# Patient Record
Sex: FEMALE | Race: WHITE | NOT HISPANIC OR LATINO | Employment: FULL TIME | URBAN - METROPOLITAN AREA
[De-identification: names, ages, dates, MRNs, and addresses within clinical notes are randomized per-mention and may not be internally consistent; named-entity substitution may affect disease eponyms.]

---

## 2023-12-26 ENCOUNTER — HOSPITAL ENCOUNTER (OUTPATIENT)
Dept: VASCULAR MEDICINE | Facility: CLINIC | Age: 63
Discharge: HOME | End: 2023-12-26
Payer: COMMERCIAL

## 2023-12-26 ENCOUNTER — APPOINTMENT (OUTPATIENT)
Dept: VASCULAR MEDICINE | Facility: HOSPITAL | Age: 63
End: 2023-12-26
Payer: COMMERCIAL

## 2023-12-26 DIAGNOSIS — M79.89 OTHER SPECIFIED SOFT TISSUE DISORDERS: ICD-10-CM

## 2023-12-26 DIAGNOSIS — M79.605 PAIN IN LEFT LEG: ICD-10-CM

## 2023-12-26 DIAGNOSIS — I83.893 VARICOSE VEINS OF BILATERAL LOWER EXTREMITIES WITH OTHER COMPLICATIONS: ICD-10-CM

## 2023-12-26 DIAGNOSIS — M79.604 PAIN IN RIGHT LEG: ICD-10-CM

## 2023-12-26 PROCEDURE — 93970 EXTREMITY STUDY: CPT | Performed by: SURGERY

## 2023-12-26 PROCEDURE — 93970 EXTREMITY STUDY: CPT

## 2024-01-04 ENCOUNTER — OFFICE VISIT (OUTPATIENT)
Dept: VASCULAR SURGERY | Facility: CLINIC | Age: 64
End: 2024-01-04
Payer: COMMERCIAL

## 2024-01-04 VITALS
DIASTOLIC BLOOD PRESSURE: 64 MMHG | HEIGHT: 66 IN | HEART RATE: 95 BPM | OXYGEN SATURATION: 97 % | WEIGHT: 220 LBS | BODY MASS INDEX: 35.36 KG/M2 | SYSTOLIC BLOOD PRESSURE: 118 MMHG

## 2024-01-04 DIAGNOSIS — I87.2 CHRONIC VENOUS INSUFFICIENCY OF LOWER EXTREMITY: Primary | ICD-10-CM

## 2024-01-04 PROCEDURE — 1036F TOBACCO NON-USER: CPT | Performed by: SURGERY

## 2024-01-04 PROCEDURE — 99213 OFFICE O/P EST LOW 20 MIN: CPT | Performed by: SURGERY

## 2024-01-04 RX ORDER — TELMISARTAN AND AMLODIPINE 10; 80 MG/1; MG/1
1 TABLET ORAL DAILY
COMMUNITY
Start: 2021-03-11

## 2024-01-04 NOTE — PROGRESS NOTES
"Mihaela Reaves is a 63 y.o. female     Subjective   This patient presents today for follow-up of her venous issues of her lower extremities.  She states that her lower extremity volume has been good.  She is wearing her compression stockings on a very consistent basis.  She has a very bad right knee that we will be needing a total knee replacement very soon with Dr. Drake.  She currently denies any fever chills nausea vomiting or headache.  She has not had any DVTs in the past         Objective     Vitals:    01/04/24 0700   BP: 118/64   Pulse: 95   SpO2: 97%      Physical Exam  This patient is alert and oriented x3 her head is normocephalic neck is soft and supple without palpable lymph nodes.  Heart is regular rate.  Lungs are clear.  Abdomen is obese and soft with positive bowel sounds.  There is no pain on palpation.  Upper and lower extremities have adequate range of motion with palpable brachial radial femoral and popliteal pulses.  Skin turgor is adequate.  Psychologically the patient appears to be acting appropriately.  Her lower leg volume is relatively normal.  Blood pressure 118/64, pulse 95, height 1.676 m (5' 6\"), weight 99.8 kg (220 lb), SpO2 97 %.            There are no problems to display for this patient.         Current Outpatient Medications:     telmisartan-amlodipine (Twynsta) 80-10 mg tablet, Take 1 tablet by mouth once daily., Disp: , Rfl:      Lab Results   Component Value Date    WBC 4.7 01/11/2022    HGB 13.6 01/11/2022    HCT 40.4 01/11/2022     (L) 01/11/2022    ALT 17 01/11/2022    AST 18 01/11/2022     01/11/2022    K 4.1 01/11/2022     01/11/2022    CREATININE 0.79 01/11/2022    BUN 24 (H) 01/11/2022    CO2 28 01/11/2022    INR 1.0 08/03/2021       Vascular US lower extremity venous insufficiency bilateral    Result Date: 12/26/2023           [facility], Vascular Lab [address], [place] [address]             Tel " [phone] and Fax [phone]  Vascular Lab Report VASC US LOWER EXTREMITY VENOUS INSUFFICIENCY BILATERAL  Patient Name:      YOVANY HUANGCOLIN       Reading Physician:  [id number] Jerilyn Mendez MD Study Date:        12/26/2023          Ordering Physician: [id number] ADALBERTO CARDENAS MRN/PID:           [id number]            Technologist:       Nicanor Frazier RVT, [facility] Accession#:        [id number]        Technologist 2: Date of Birth/Age: 1960 / 63 years Encounter#:         [id number] Gender:            F Admission Status:  Outpatient          Location Performed: [facility]  Diagnosis/ICD:    Varicose veins of bilateral lower extremities with other                   complications-I83.893 CPT Codes:        28805 Venous reflux study VV VI complete Patient Position: Study performed in a reverse Trendelenburg position.  CONCLUSIONS: Right Lower Venous Insufficiency: Reflux is noted in the knee level of great saphenous vein. There is reflux noted in the common femoral vein. The AASV is noted to have a 3.5sec reflux measuring aprox. 5.6mm and exit the fascia a aprox. 15.8cm from the origin. The GSV is noted to exit the fascia at the proximal thigh to the knee level. The GSV is noted to be fibrotic from proximal to distal calf segment. Prominent tributary with a reflux of 4.4sec and measuring aprox. 3.7mm is noted from the proximal to distal calf with multiple branching. Left Lower Venous Insufficiency: The left great saphenous vein was not visualized due to previous ablation. Right Lower Venous: No evidence of acute deep vein thrombus visualized in the right lower extremity. Left Lower Venous: No evidence of acute deep vein thrombus visualized in the left lower extremity.  Imaging & Doppler Findings:  Right          Compress Thrombus  Diam  Depth    Time SFJ              Yes      None                  7.30 sec Prox Thigh GSV   Yes      None Mid Thigh GSV    Yes      None Knee GSV         Yes      None   3.7 mm 5.6 mm 4.50 sec Prox Calf GSV           Fibrotic Mid Calf GSV            Fibrotic Dist Calf GSV           Fibrotic SPJ              Yes      None SSV Prox         Yes      None SSV Mid          Yes      None SSV Distal       Yes      None  Left       Compress Thrombus SFJ          Yes      None SPJ          Yes      None SSV Prox     Yes      None SSV Mid      Yes      None SSV Distal   Yes      None  Right       Compressible Thrombus        Flow CFV             Yes        None   Spontaneous/Phasic PFV             Yes        None FV Proximal     Yes        None FV Mid          Yes        None   Spontaneous/Phasic FV Distal       Yes        None Popliteal       Yes        None   Spontaneous/Phasic Peroneal        Yes        None PTV             Yes        None  Left        Compress Thrombus        Flow CFV           Yes      None   Spontaneous/Phasic PFV           Yes      None FV Proximal   Yes      None FV Mid        Yes      None   Spontaneous/Phasic FV Distal     Yes      None Popliteal     Yes      None   Spontaneous/Phasic Peroneal      Yes      None PTV           Yes      None  [id number] Jerilyn Mendez MD Electronically signed by [id number] Jerilyn Mendez MD on 12/26/2023 at 5:40:41 PM  ** Final **                 Assessment/Plan   Active Problems:  There are no active Hospital Problems.      This patient presents today for follow-up of her venous issues of her lower extremities.  She does wear her compression stockings on a very consistent basis.  She is obese at 5 foot 6 and weighs 220 pounds.  She has a very bad right knee and will be getting a right total knee replacement very soon with Dr. Drake.  Her recent venous duplex scan does not show any significant deep system reflux.  She has segmental reflux in her right greater saphenous vein that is minimal.  She has palpable pedal pulses.  At this  time, she can schedule her right total knee replacement from a vascular standpoint.  She will follow-up with me in 6 months      Michael Weinstein, DO

## 2024-01-25 ENCOUNTER — PREP FOR PROCEDURE (OUTPATIENT)
Dept: SURGERY | Facility: HOSPITAL | Age: 64
End: 2024-01-25
Payer: COMMERCIAL

## 2024-01-25 DIAGNOSIS — M17.11 ARTHRITIS OF RIGHT KNEE: Primary | ICD-10-CM

## 2024-01-25 RX ORDER — TRANEXAMIC ACID 100 MG/ML
1000 INJECTION, SOLUTION INTRAVENOUS ONCE
Status: CANCELLED | OUTPATIENT
Start: 2024-01-25 | End: 2024-01-25

## 2024-01-25 RX ORDER — CEFAZOLIN SODIUM 2 G/100ML
2 INJECTION, SOLUTION INTRAVENOUS EVERY 8 HOURS
Status: CANCELLED | OUTPATIENT
Start: 2024-02-13 | End: 2024-02-13

## 2024-01-25 RX ORDER — SODIUM CHLORIDE, SODIUM LACTATE, POTASSIUM CHLORIDE, CALCIUM CHLORIDE 600; 310; 30; 20 MG/100ML; MG/100ML; MG/100ML; MG/100ML
75 INJECTION, SOLUTION INTRAVENOUS CONTINUOUS
Status: CANCELLED | OUTPATIENT
Start: 2024-02-13

## 2024-02-02 ENCOUNTER — PREP FOR PROCEDURE (OUTPATIENT)
Dept: SURGERY | Facility: HOSPITAL | Age: 64
End: 2024-02-02
Payer: COMMERCIAL

## 2024-02-06 ENCOUNTER — PRE-ADMISSION TESTING (OUTPATIENT)
Dept: PREADMISSION TESTING | Facility: HOSPITAL | Age: 64
End: 2024-02-06
Payer: COMMERCIAL

## 2024-02-06 VITALS
WEIGHT: 219.8 LBS | TEMPERATURE: 98.1 F | SYSTOLIC BLOOD PRESSURE: 144 MMHG | BODY MASS INDEX: 35.32 KG/M2 | RESPIRATION RATE: 18 BRPM | OXYGEN SATURATION: 97 % | HEART RATE: 90 BPM | DIASTOLIC BLOOD PRESSURE: 83 MMHG | HEIGHT: 66 IN

## 2024-02-06 DIAGNOSIS — M17.11 ARTHRITIS OF RIGHT KNEE: ICD-10-CM

## 2024-02-06 LAB
ALBUMIN SERPL BCP-MCNC: 4.4 G/DL (ref 3.4–5)
ALP SERPL-CCNC: 113 U/L (ref 33–136)
ALT SERPL W P-5'-P-CCNC: 14 U/L (ref 7–45)
ANION GAP SERPL CALC-SCNC: 11 MMOL/L (ref 10–20)
APTT PPP: 33 SECONDS (ref 27–38)
AST SERPL W P-5'-P-CCNC: 18 U/L (ref 9–39)
BILIRUB SERPL-MCNC: 1 MG/DL (ref 0–1.2)
BUN SERPL-MCNC: 22 MG/DL (ref 6–23)
CALCIUM SERPL-MCNC: 9.3 MG/DL (ref 8.6–10.3)
CHLORIDE SERPL-SCNC: 107 MMOL/L (ref 98–107)
CO2 SERPL-SCNC: 25 MMOL/L (ref 21–32)
CREAT SERPL-MCNC: 0.7 MG/DL (ref 0.5–1.05)
EGFRCR SERPLBLD CKD-EPI 2021: >90 ML/MIN/1.73M*2
ERYTHROCYTE [DISTWIDTH] IN BLOOD BY AUTOMATED COUNT: 12.8 % (ref 11.5–14.5)
GLUCOSE SERPL-MCNC: 112 MG/DL (ref 74–99)
HCT VFR BLD AUTO: 42.8 % (ref 36–46)
HGB BLD-MCNC: 14.4 G/DL (ref 12–16)
INR PPP: 1 (ref 0.9–1.1)
MCH RBC QN AUTO: 28.7 PG (ref 26–34)
MCHC RBC AUTO-ENTMCNC: 33.6 G/DL (ref 32–36)
MCV RBC AUTO: 85 FL (ref 80–100)
NRBC BLD-RTO: 0 /100 WBCS (ref 0–0)
PLATELET # BLD AUTO: 167 X10*3/UL (ref 150–450)
POTASSIUM SERPL-SCNC: 4.3 MMOL/L (ref 3.5–5.3)
PROT SERPL-MCNC: 6.6 G/DL (ref 6.4–8.2)
PROTHROMBIN TIME: 10.7 SECONDS (ref 9.8–12.8)
RBC # BLD AUTO: 5.01 X10*6/UL (ref 4–5.2)
SODIUM SERPL-SCNC: 139 MMOL/L (ref 136–145)
WBC # BLD AUTO: 5.1 X10*3/UL (ref 4.4–11.3)

## 2024-02-06 PROCEDURE — 87081 CULTURE SCREEN ONLY: CPT | Mod: PARLAB | Performed by: NURSE PRACTITIONER

## 2024-02-06 PROCEDURE — 99204 OFFICE O/P NEW MOD 45 MIN: CPT | Performed by: NURSE PRACTITIONER

## 2024-02-06 PROCEDURE — 85027 COMPLETE CBC AUTOMATED: CPT

## 2024-02-06 PROCEDURE — 85610 PROTHROMBIN TIME: CPT

## 2024-02-06 PROCEDURE — 93005 ELECTROCARDIOGRAM TRACING: CPT

## 2024-02-06 PROCEDURE — 80053 COMPREHEN METABOLIC PANEL: CPT

## 2024-02-06 PROCEDURE — 36415 COLL VENOUS BLD VENIPUNCTURE: CPT

## 2024-02-06 ASSESSMENT — DUKE ACTIVITY SCORE INDEX (DASI)
CAN YOU RUN A SHORT DISTANCE: NO
CAN YOU DO MODERATE WORK AROUND THE HOUSE LIKE VACUUMING, SWEEPING FLOORS OR CARRYING GROCERIES: YES
CAN YOU PARTICIPATE IN MODERATE RECREATIONAL ACTIVITIES LIKE GOLF, BOWLING, DANCING, DOUBLES TENNIS OR THROWING A BASEBALL OR FOOTBALL: NO
CAN YOU DO LIGHT WORK AROUND THE HOUSE LIKE DUSTING OR WASHING DISHES: YES
CAN YOU WALK A BLOCK OR TWO ON LEVEL GROUND: YES
TOTAL_SCORE: 18.95
CAN YOU HAVE SEXUAL RELATIONS: NO
CAN YOU TAKE CARE OF YOURSELF (EAT, DRESS, BATHE, OR USE TOILET): YES
CAN YOU DO HEAVY WORK AROUND THE HOUSE LIKE SCRUBBING FLOORS OR LIFTING AND MOVING HEAVY FURNITURE: NO
CAN YOU WALK INDOORS, SUCH AS AROUND YOUR HOUSE: YES
CAN YOU CLIMB A FLIGHT OF STAIRS OR WALK UP A HILL: YES
CAN YOU DO YARD WORK LIKE RAKING LEAVES, WEEDING OR PUSHING A MOWER: NO
CAN YOU PARTICIPATE IN STRENOUS SPORTS LIKE SWIMMING, SINGLES TENNIS, FOOTBALL, BASKETBALL, OR SKIING: NO
DASI METS SCORE: 5.1

## 2024-02-06 NOTE — CPM/PAT H&P
"CPM/PAT Evaluation       Name: Mihaela Reaves (Mihaela Reaves)  /Age: 63 y.o.     In-Person       Chief Complaint: Right knee OA    63 yr old female with c/o Right knee OA.  Reports ongoing progressive pain worsened by activity including walking, standing and stairs.  Pain is constant ache w/intermittent stiffness; denies falls.  Reports knee is 'bone on bone' and causing much loss of sleep d/t pain. Has tried injections and physical therapy with no lasting relief.  Reports not as active as desired d/t knee pain, as cannot do enjoyable activities; denies cardiac or respiratory symptoms.  Denies past issues with anesthesia.  Patient requests \"general anesthesia\" for this surgery and states \"I do not want a spinal\".          Past Medical History:   Diagnosis Date    Arthritis     Colon polyp     GERD (gastroesophageal reflux disease)     Hypertension     Personal history of other diseases of the female genital tract     History of heavy periods       Past Surgical History:   Procedure Laterality Date     SECTION, CLASSIC  2018     Section    HYSTERECTOMY  2018    Hysterectomy    OTHER SURGICAL HISTORY  2022    Knee replacement    TONSILLECTOMY  2018    Tonsillectomy    VEIN SURGERY         Patient Sexual activity questions deferred to the physician.    No family history on file.    Allergies   Allergen Reactions    Tetracyclines GI Upset, GI intolerance, Diarrhea and Other     Yeast infection    Sulfa (Sulfonamide Antibiotics) Hives       Prior to Admission medications    Medication Sig Start Date End Date Taking? Authorizing Provider   telmisartan-amlodipine (Twynsta) 80-10 mg tablet Take 1 tablet by mouth once daily. 3/11/21   Historical Provider, MD        Review of Systems    Constitutional: no fever, no chills and not feeling poorly.   Eyes: no eyesight problems.   ENT: no hearing loss, no nosebleeds and no sore throat.   Cardiovascular: no chest pain, no " palpitations and no extremity edema.   Respiratory: no shortness of breath, no wheezing, no cough and no sob with exertion.   Gastrointestinal: negative for abdominal pain, blood in stools or changes in bowel habits   Genitourinary: negative for dysuria, incontinence or changes in urinary habits   Musculoskeletal: mild gait abnormality   Integumentary: negative for lesions, rash or itching.   Neurological: negative for confusion, dizziness, fainting or difficulty walking.   Psychiatric: not suicidal, no anxiety and no depression.   All other systems have been reviewed and are negative for complaint.     Physical Exam  Constitutional:       General: No acute distress.     Aox3, pleasant and cooperative, appropriate mood and eye contact   HENT:      Head: Normocephalic.      Mouth/Throat: Mucous membranes moist & pink  Eyes:      Vision grossly intact, PERRLA, corrective lenses in use   Neck:      No carotid bruit, no JVD  Cardiovascular:      RRR, S1S2, no murmurs, rubs or gallops  Pulmonary:      Symmetric chest expansion, CTA, Room Air  Abdominal:      Soft non-tender, BSx4   Skin:     Warm, dry & intact   Extremities:      No gross deformities; mild antalgic gait; compression stocking in use   Neurological:      No focal deficit, Aox3, TOVAR x4  Psychiatric:      Pleasant & cooperative, appropriate affect    PAT AIRWAY:   Airway:     Mallampati::  II    TM distance::  >3 FB    Neck ROM::  Full   Crowns       Visit Vitals  /83   Pulse 90   Temp 36.7 °C (98.1 °F) (Temporal)   Resp 18       DASI Risk Score      Flowsheet Row Most Recent Value   DASI SCORE 18.95   METS Score (Will be calculated only when all the questions are answered) 5.1          Caprini DVT Assessment    No data to display       Modified Frailty Index    No data to display       CHADS2 Stroke Risk  Current as of 14 minutes ago        N/A 3 - 100%: High Risk   2 - 3%: Medium Risk   0 - 2%: Low Risk     Last Change: N/A          This score  determines the patient's risk of having a stroke if the patient has atrial fibrillation.        This score is not applicable to this patient. Components are not calculated.          Revised Cardiac Risk Index    No data to display       Apfel Simplified Score    No data to display       Risk Analysis Index Results This Encounter    No data found in the last 1 encounters.       Stop Bang Score      Flowsheet Row Most Recent Value   Do you snore loudly? 1   Do you often feel tired or fatigued after your sleep? 0   Has anyone ever observed you stop breathing in your sleep? 0   Do you have or are you being treated for high blood pressure? 1   Recent BMI (Calculated) 35.5   Is BMI greater than 35 kg/m2? 1=Yes   Age older than 50 years old? 1=Yes   Is your neck circumference greater than 17 inches (Male) or 16 inches (Female)? 0   Gender - Male 0=No   STOP-BANG Total Score 4            Assessment and Plan:     Followed by ortho, Right knee OA    Vascular clearance provided ([place], 1/4/2024)    Right total knee replacement w/Dr Drake on 2/13/2024

## 2024-02-06 NOTE — PREPROCEDURE INSTRUCTIONS
Medication List            Accurate as of February 6, 2024  7:49 AM. Always use your most recent med list.                telmisartan-amlodipine 80-10 mg tablet  Commonly known as: Twynsta  Medication Adjustments for Surgery: Continue until night before surgery                          PRE-OPERATIVE INSTRUCTIONS FOR SURGERY    *Do not eat anything after midnight the night of surgery.  This includes food of any kind (including hard candy, cough drops, mints and gum) and beverages (including coffee and soda).  You may drink up to one 8 ounce glass of water up until three hours before your scheduled surgery time.    *One of our staff members will call you ONE business day before your surgery, between 11a-2p  To let you know the time to arrive.  If you have no received a call by 2 pm, call [phone]  *When you arrive at the hospital-->GO TO Registration on the ground floor  *Stop smoking 24 hours prior to surgery.  No Marijuana, CBD Oil or Vaping for 48 hours  *No alcohol 24 hours prior to surgery  *You will need a responsible adult to drive you home  -No acrylic nails or nail polish on at least one fingernail, NO polish on toes for foot surgery  -You may be asked to remove your dentures, partial plate, eyeglasses or contact lenses before going to surgery.  Please bring a case for these items.  -Body piercings need to be removed.  Jewelry and valuables should be left at home.  -Put on loose,  comfortable, clean clothing, that will accommodate bandages    HOME PREOPERATIVE ANTIBACTERIAL SHOWER:  -This shower is a way of cleaning the skin with germ killing solution before surgery.  The solution contains Chorhexidine (CHG).   Let your DrKylee Know if you are allergic to Chlorhexidine.    NIGHT BEFORE SURGERY:  IF you are having a TOTAL joint replacement- YOU WILL SHOWER 2 NIGHTS PRIOR to surgery    -Take a normal shower and wash your hair  -Turn OFF water to avoid rinsing the antibacterial skin cleanser off  (CHG).  -Apply the CHG cleanser to a clean and wet washcloth.  Lather your entire body from the neck down.    -DO NOT USE ON THE HEAD, FACE, or GENITALS.  -RINSE immediately if CHG is in contact with your eyes, ears or mouth  -Gently wash your body.  Have the CHG cleanser stay on your skin for 3 MINUTES.  -After 3 minutes, turn on water and rinse the CHG cleanser off your body completely  -DO NOT WASH with regular soap after using CHG.  -PAT DRY with a clean fresh towel  -DO NOT apply any blount, deodorants or lotions  -Dress in clean night cloths  **CHG cleanser will cause stains to fabrics if you wash them with bleach after use.     DAY OF SURGERY:  -Take shower-->JUST GET WET.  Turn OFF water.  -REPEAT the CHG cleanse as you did the night before.  -PAT DRY-->    What you may be asked to bring to surgery:  ___Crutches, walker      NPO Instructions:    Do not eat any food after midnight the night before your surgery/procedure.    Additional Instructions:     Day of Surgery:  Wear  comfortable loose fitting clothing  Do not use moisturizers, creams, lotions or perfume  All jewelry and valuables should be left at home

## 2024-02-08 LAB — STAPHYLOCOCCUS SPEC CULT: NORMAL

## 2024-02-10 LAB
ATRIAL RATE: 79 BPM
P AXIS: 40 DEGREES
P OFFSET: 172 MS
P ONSET: 140 MS
PR INTERVAL: 162 MS
Q ONSET: 221 MS
QRS COUNT: 13 BEATS
QRS DURATION: 78 MS
QT INTERVAL: 398 MS
QTC CALCULATION(BAZETT): 456 MS
QTC FREDERICIA: 436 MS
R AXIS: -12 DEGREES
T AXIS: 27 DEGREES
T OFFSET: 420 MS
VENTRICULAR RATE: 79 BPM

## 2024-02-13 ENCOUNTER — ANESTHESIA EVENT (OUTPATIENT)
Dept: OPERATING ROOM | Facility: HOSPITAL | Age: 64
End: 2024-02-13
Payer: COMMERCIAL

## 2024-02-13 ENCOUNTER — ANESTHESIA (OUTPATIENT)
Dept: OPERATING ROOM | Facility: HOSPITAL | Age: 64
End: 2024-02-13
Payer: COMMERCIAL

## 2024-02-13 ENCOUNTER — HOSPITAL ENCOUNTER (OUTPATIENT)
Facility: HOSPITAL | Age: 64
LOS: 1 days | Discharge: HOME HEALTH | End: 2024-02-13
Attending: ORTHOPAEDIC SURGERY | Admitting: ORTHOPAEDIC SURGERY
Payer: COMMERCIAL

## 2024-02-13 VITALS
TEMPERATURE: 97.3 F | SYSTOLIC BLOOD PRESSURE: 135 MMHG | HEART RATE: 96 BPM | WEIGHT: 219.8 LBS | RESPIRATION RATE: 16 BRPM | BODY MASS INDEX: 35.48 KG/M2 | DIASTOLIC BLOOD PRESSURE: 63 MMHG | OXYGEN SATURATION: 95 %

## 2024-02-13 DIAGNOSIS — Z01.818 PRE-OP TESTING: ICD-10-CM

## 2024-02-13 DIAGNOSIS — M17.11 ARTHRITIS OF RIGHT KNEE: Primary | ICD-10-CM

## 2024-02-13 DIAGNOSIS — G89.18 ACUTE POST-OPERATIVE PAIN: ICD-10-CM

## 2024-02-13 PROBLEM — I10 HTN (HYPERTENSION): Status: ACTIVE | Noted: 2024-02-13

## 2024-02-13 PROCEDURE — 2500000004 HC RX 250 GENERAL PHARMACY W/ HCPCS (ALT 636 FOR OP/ED): Performed by: PHYSICIAN ASSISTANT

## 2024-02-13 PROCEDURE — 88311 DECALCIFY TISSUE: CPT | Performed by: PATHOLOGY

## 2024-02-13 PROCEDURE — 7100000002 HC RECOVERY ROOM TIME - EACH INCREMENTAL 1 MINUTE: Performed by: ORTHOPAEDIC SURGERY

## 2024-02-13 PROCEDURE — 97530 THERAPEUTIC ACTIVITIES: CPT | Mod: GO

## 2024-02-13 PROCEDURE — 7100000010 HC PHASE TWO TIME - EACH INCREMENTAL 1 MINUTE: Performed by: ORTHOPAEDIC SURGERY

## 2024-02-13 PROCEDURE — 97165 OT EVAL LOW COMPLEX 30 MIN: CPT | Mod: GO

## 2024-02-13 PROCEDURE — C1776 JOINT DEVICE (IMPLANTABLE): HCPCS | Performed by: ORTHOPAEDIC SURGERY

## 2024-02-13 PROCEDURE — 2500000004 HC RX 250 GENERAL PHARMACY W/ HCPCS (ALT 636 FOR OP/ED): Performed by: ANESTHESIOLOGY

## 2024-02-13 PROCEDURE — 2780000003 HC OR 278 NO HCPCS: Performed by: ORTHOPAEDIC SURGERY

## 2024-02-13 PROCEDURE — 2500000005 HC RX 250 GENERAL PHARMACY W/O HCPCS: Performed by: ANESTHESIOLOGIST ASSISTANT

## 2024-02-13 PROCEDURE — 7100000009 HC PHASE TWO TIME - INITIAL BASE CHARGE: Performed by: ORTHOPAEDIC SURGERY

## 2024-02-13 PROCEDURE — 7100000011 HC EXTENDED STAY RECOVERY HOURLY - NURSING UNIT

## 2024-02-13 PROCEDURE — 97535 SELF CARE MNGMENT TRAINING: CPT | Mod: GO

## 2024-02-13 PROCEDURE — 2720000007 HC OR 272 NO HCPCS: Performed by: ORTHOPAEDIC SURGERY

## 2024-02-13 PROCEDURE — A27447 PR TOTAL KNEE ARTHROPLASTY: Performed by: ANESTHESIOLOGIST ASSISTANT

## 2024-02-13 PROCEDURE — A27447 PR TOTAL KNEE ARTHROPLASTY: Performed by: ANESTHESIOLOGY

## 2024-02-13 PROCEDURE — 3600000018 HC OR TIME - INITIAL BASE CHARGE - PROCEDURE LEVEL SIX: Performed by: ORTHOPAEDIC SURGERY

## 2024-02-13 PROCEDURE — 88305 TISSUE EXAM BY PATHOLOGIST: CPT | Mod: TC,SUR,PARLAB | Performed by: ORTHOPAEDIC SURGERY

## 2024-02-13 PROCEDURE — 7100000001 HC RECOVERY ROOM TIME - INITIAL BASE CHARGE: Performed by: ORTHOPAEDIC SURGERY

## 2024-02-13 PROCEDURE — 3700000001 HC GENERAL ANESTHESIA TIME - INITIAL BASE CHARGE: Performed by: ORTHOPAEDIC SURGERY

## 2024-02-13 PROCEDURE — 88305 TISSUE EXAM BY PATHOLOGIST: CPT | Performed by: PATHOLOGY

## 2024-02-13 PROCEDURE — 97161 PT EVAL LOW COMPLEX 20 MIN: CPT | Mod: GP

## 2024-02-13 PROCEDURE — 3700000002 HC GENERAL ANESTHESIA TIME - EACH INCREMENTAL 1 MINUTE: Performed by: ORTHOPAEDIC SURGERY

## 2024-02-13 PROCEDURE — 2500000004 HC RX 250 GENERAL PHARMACY W/ HCPCS (ALT 636 FOR OP/ED)

## 2024-02-13 PROCEDURE — A4217 STERILE WATER/SALINE, 500 ML: HCPCS | Performed by: ORTHOPAEDIC SURGERY

## 2024-02-13 PROCEDURE — 2500000004 HC RX 250 GENERAL PHARMACY W/ HCPCS (ALT 636 FOR OP/ED): Performed by: ORTHOPAEDIC SURGERY

## 2024-02-13 PROCEDURE — 2500000004 HC RX 250 GENERAL PHARMACY W/ HCPCS (ALT 636 FOR OP/ED): Performed by: ANESTHESIOLOGIST ASSISTANT

## 2024-02-13 PROCEDURE — 3600000017 HC OR TIME - EACH INCREMENTAL 1 MINUTE - PROCEDURE LEVEL SIX: Performed by: ORTHOPAEDIC SURGERY

## 2024-02-13 DEVICE — IMPLANTABLE DEVICE: Type: IMPLANTABLE DEVICE | Site: KNEE | Status: FUNCTIONAL

## 2024-02-13 DEVICE — PATELLA OVAL DOME 35: Type: IMPLANTABLE DEVICE | Site: KNEE | Status: FUNCTIONAL

## 2024-02-13 RX ORDER — TRANEXAMIC ACID 10 MG/ML
1000 INJECTION, SOLUTION INTRAVENOUS ONCE
Status: COMPLETED | OUTPATIENT
Start: 2024-02-13 | End: 2024-02-13

## 2024-02-13 RX ORDER — TRANEXAMIC ACID 10 MG/ML
1000 INJECTION, SOLUTION INTRAVENOUS ONCE
Status: DISCONTINUED | OUTPATIENT
Start: 2024-02-13 | End: 2024-02-13 | Stop reason: HOSPADM

## 2024-02-13 RX ORDER — MEPERIDINE HYDROCHLORIDE 25 MG/ML
12.5 INJECTION INTRAMUSCULAR; INTRAVENOUS; SUBCUTANEOUS EVERY 10 MIN PRN
Status: DISCONTINUED | OUTPATIENT
Start: 2024-02-13 | End: 2024-02-13 | Stop reason: HOSPADM

## 2024-02-13 RX ORDER — MORPHINE SULFATE 4 MG/ML
4 INJECTION INTRAVENOUS EVERY 4 HOURS PRN
Status: DISCONTINUED | OUTPATIENT
Start: 2024-02-13 | End: 2024-02-13 | Stop reason: HOSPADM

## 2024-02-13 RX ORDER — PROMETHAZINE HYDROCHLORIDE 25 MG/ML
INJECTION, SOLUTION INTRAMUSCULAR; INTRAVENOUS
Status: DISCONTINUED
Start: 2024-02-13 | End: 2024-02-13 | Stop reason: HOSPADM

## 2024-02-13 RX ORDER — POLYETHYLENE GLYCOL 3350 17 G/17G
17 POWDER, FOR SOLUTION ORAL DAILY
Start: 2024-02-13

## 2024-02-13 RX ORDER — MEPERIDINE HYDROCHLORIDE 25 MG/ML
INJECTION INTRAMUSCULAR; INTRAVENOUS; SUBCUTANEOUS AS NEEDED
Status: DISCONTINUED | OUTPATIENT
Start: 2024-02-13 | End: 2024-02-13

## 2024-02-13 RX ORDER — ONDANSETRON 4 MG/1
4 TABLET, FILM COATED ORAL EVERY 8 HOURS PRN
Status: DISCONTINUED | OUTPATIENT
Start: 2024-02-13 | End: 2024-02-13 | Stop reason: HOSPADM

## 2024-02-13 RX ORDER — TRANEXAMIC ACID 10 MG/ML
INJECTION, SOLUTION INTRAVENOUS
Status: COMPLETED
Start: 2024-02-13 | End: 2024-02-13

## 2024-02-13 RX ORDER — OXYCODONE HYDROCHLORIDE 5 MG/1
5-10 TABLET ORAL EVERY 6 HOURS PRN
Qty: 56 TABLET | Refills: 0 | Status: SHIPPED | OUTPATIENT
Start: 2024-02-13 | End: 2024-02-20

## 2024-02-13 RX ORDER — SODIUM CHLORIDE 0.9 G/100ML
IRRIGANT IRRIGATION AS NEEDED
Status: DISCONTINUED | OUTPATIENT
Start: 2024-02-13 | End: 2024-02-13 | Stop reason: HOSPADM

## 2024-02-13 RX ORDER — SODIUM CHLORIDE, SODIUM LACTATE, POTASSIUM CHLORIDE, CALCIUM CHLORIDE 600; 310; 30; 20 MG/100ML; MG/100ML; MG/100ML; MG/100ML
75 INJECTION, SOLUTION INTRAVENOUS CONTINUOUS
Status: DISCONTINUED | OUTPATIENT
Start: 2024-02-13 | End: 2024-02-13 | Stop reason: HOSPADM

## 2024-02-13 RX ORDER — ACETAMINOPHEN 325 MG/1
650 TABLET ORAL EVERY 6 HOURS SCHEDULED
Start: 2024-02-13

## 2024-02-13 RX ORDER — LIDOCAINE HYDROCHLORIDE 10 MG/ML
0.1 INJECTION INFILTRATION; PERINEURAL ONCE
Status: DISCONTINUED | OUTPATIENT
Start: 2024-02-13 | End: 2024-02-13 | Stop reason: HOSPADM

## 2024-02-13 RX ORDER — NALOXONE HYDROCHLORIDE 1 MG/ML
0.2 INJECTION INTRAMUSCULAR; INTRAVENOUS; SUBCUTANEOUS EVERY 5 MIN PRN
Status: DISCONTINUED | OUTPATIENT
Start: 2024-02-13 | End: 2024-02-13 | Stop reason: HOSPADM

## 2024-02-13 RX ORDER — OXYCODONE HYDROCHLORIDE 5 MG/1
10 TABLET ORAL EVERY 4 HOURS PRN
Status: DISCONTINUED | OUTPATIENT
Start: 2024-02-13 | End: 2024-02-13 | Stop reason: HOSPADM

## 2024-02-13 RX ORDER — PROPOFOL 10 MG/ML
INJECTION, EMULSION INTRAVENOUS AS NEEDED
Status: DISCONTINUED | OUTPATIENT
Start: 2024-02-13 | End: 2024-02-13

## 2024-02-13 RX ORDER — OXYCODONE HYDROCHLORIDE 5 MG/1
5 TABLET ORAL EVERY 6 HOURS PRN
Status: DISCONTINUED | OUTPATIENT
Start: 2024-02-13 | End: 2024-02-13 | Stop reason: HOSPADM

## 2024-02-13 RX ORDER — MIDAZOLAM HYDROCHLORIDE 1 MG/ML
INJECTION, SOLUTION INTRAMUSCULAR; INTRAVENOUS AS NEEDED
Status: DISCONTINUED | OUTPATIENT
Start: 2024-02-13 | End: 2024-02-13

## 2024-02-13 RX ORDER — CEFAZOLIN SODIUM 2 G/100ML
2 INJECTION, SOLUTION INTRAVENOUS EVERY 8 HOURS
Status: DISCONTINUED | OUTPATIENT
Start: 2024-02-13 | End: 2024-02-13 | Stop reason: HOSPADM

## 2024-02-13 RX ORDER — ONDANSETRON HYDROCHLORIDE 2 MG/ML
INJECTION, SOLUTION INTRAVENOUS AS NEEDED
Status: DISCONTINUED | OUTPATIENT
Start: 2024-02-13 | End: 2024-02-13

## 2024-02-13 RX ORDER — CEFAZOLIN SODIUM 2 G/100ML
2 INJECTION, SOLUTION INTRAVENOUS EVERY 8 HOURS
Status: COMPLETED | OUTPATIENT
Start: 2024-02-13 | End: 2024-02-13

## 2024-02-13 RX ORDER — DEXAMETHASONE SODIUM PHOSPHATE 4 MG/ML
INJECTION, SOLUTION INTRA-ARTICULAR; INTRALESIONAL; INTRAMUSCULAR; INTRAVENOUS; SOFT TISSUE AS NEEDED
Status: DISCONTINUED | OUTPATIENT
Start: 2024-02-13 | End: 2024-02-13

## 2024-02-13 RX ORDER — FENTANYL CITRATE 50 UG/ML
INJECTION, SOLUTION INTRAMUSCULAR; INTRAVENOUS AS NEEDED
Status: DISCONTINUED | OUTPATIENT
Start: 2024-02-13 | End: 2024-02-13

## 2024-02-13 RX ORDER — SODIUM CHLORIDE, SODIUM LACTATE, POTASSIUM CHLORIDE, CALCIUM CHLORIDE 600; 310; 30; 20 MG/100ML; MG/100ML; MG/100ML; MG/100ML
100 INJECTION, SOLUTION INTRAVENOUS CONTINUOUS
Status: DISCONTINUED | OUTPATIENT
Start: 2024-02-13 | End: 2024-02-13 | Stop reason: HOSPADM

## 2024-02-13 RX ORDER — METOCLOPRAMIDE HYDROCHLORIDE 5 MG/ML
INJECTION INTRAMUSCULAR; INTRAVENOUS AS NEEDED
Status: DISCONTINUED | OUTPATIENT
Start: 2024-02-13 | End: 2024-02-13

## 2024-02-13 RX ORDER — ONDANSETRON 4 MG/1
4 TABLET, FILM COATED ORAL EVERY 8 HOURS PRN
Qty: 20 TABLET | Refills: 0 | Status: SHIPPED | OUTPATIENT
Start: 2024-02-13

## 2024-02-13 RX ORDER — LIDOCAINE HCL/PF 100 MG/5ML
SYRINGE (ML) INTRAVENOUS AS NEEDED
Status: DISCONTINUED | OUTPATIENT
Start: 2024-02-13 | End: 2024-02-13

## 2024-02-13 RX ORDER — ROCURONIUM BROMIDE 10 MG/ML
INJECTION, SOLUTION INTRAVENOUS AS NEEDED
Status: DISCONTINUED | OUTPATIENT
Start: 2024-02-13 | End: 2024-02-13

## 2024-02-13 RX ORDER — ONDANSETRON HYDROCHLORIDE 2 MG/ML
4 INJECTION, SOLUTION INTRAVENOUS EVERY 8 HOURS PRN
Status: DISCONTINUED | OUTPATIENT
Start: 2024-02-13 | End: 2024-02-13 | Stop reason: HOSPADM

## 2024-02-13 RX ORDER — SCOLOPAMINE TRANSDERMAL SYSTEM 1 MG/1
1 PATCH, EXTENDED RELEASE TRANSDERMAL
Status: DISCONTINUED | OUTPATIENT
Start: 2024-02-13 | End: 2024-02-13 | Stop reason: HOSPADM

## 2024-02-13 RX ORDER — POLYETHYLENE GLYCOL 3350 17 G/17G
17 POWDER, FOR SOLUTION ORAL DAILY
Status: DISCONTINUED | OUTPATIENT
Start: 2024-02-13 | End: 2024-02-13 | Stop reason: HOSPADM

## 2024-02-13 RX ORDER — SCOLOPAMINE TRANSDERMAL SYSTEM 1 MG/1
PATCH, EXTENDED RELEASE TRANSDERMAL
Status: DISCONTINUED
Start: 2024-02-13 | End: 2024-02-13 | Stop reason: HOSPADM

## 2024-02-13 RX ORDER — ACETAMINOPHEN 325 MG/1
650 TABLET ORAL EVERY 6 HOURS SCHEDULED
Status: DISCONTINUED | OUTPATIENT
Start: 2024-02-13 | End: 2024-02-13 | Stop reason: HOSPADM

## 2024-02-13 RX ORDER — HYDROMORPHONE HYDROCHLORIDE 1 MG/ML
1 INJECTION, SOLUTION INTRAMUSCULAR; INTRAVENOUS; SUBCUTANEOUS EVERY 5 MIN PRN
Status: DISCONTINUED | OUTPATIENT
Start: 2024-02-13 | End: 2024-02-13 | Stop reason: HOSPADM

## 2024-02-13 RX ADMIN — PROMETHAZINE HYDROCHLORIDE 6.25 MG: 25 INJECTION INTRAMUSCULAR; INTRAVENOUS at 12:28

## 2024-02-13 RX ADMIN — SCOLOPAMINE TRANSDERMAL SYSTEM 1 PATCH: 1 PATCH, EXTENDED RELEASE TRANSDERMAL at 16:22

## 2024-02-13 RX ADMIN — ROCURONIUM BROMIDE 50 MG: 10 INJECTION, SOLUTION INTRAVENOUS at 08:40

## 2024-02-13 RX ADMIN — MIDAZOLAM 2 MG: 1 INJECTION INTRAMUSCULAR; INTRAVENOUS at 08:24

## 2024-02-13 RX ADMIN — METOCLOPRAMIDE 10 MG: 5 INJECTION, SOLUTION INTRAMUSCULAR; INTRAVENOUS at 08:57

## 2024-02-13 RX ADMIN — ONDANSETRON 4 MG: 2 INJECTION INTRAMUSCULAR; INTRAVENOUS at 08:30

## 2024-02-13 RX ADMIN — MIDAZOLAM 2 MG: 1 INJECTION INTRAMUSCULAR; INTRAVENOUS at 08:30

## 2024-02-13 RX ADMIN — PROPOFOL 50 MG: 10 INJECTION, EMULSION INTRAVENOUS at 09:10

## 2024-02-13 RX ADMIN — MEPERIDINE HYDROCHLORIDE 25 MG: 25 INJECTION INTRAMUSCULAR; INTRAVENOUS; SUBCUTANEOUS at 10:35

## 2024-02-13 RX ADMIN — SCOPALAMINE 1 PATCH: 1 PATCH, EXTENDED RELEASE TRANSDERMAL at 16:22

## 2024-02-13 RX ADMIN — FENTANYL CITRATE 50 MCG: 50 INJECTION, SOLUTION INTRAMUSCULAR; INTRAVENOUS at 08:40

## 2024-02-13 RX ADMIN — DEXAMETHASONE SODIUM PHOSPHATE 4 MG: 4 INJECTION, SOLUTION INTRAMUSCULAR; INTRAVENOUS at 08:57

## 2024-02-13 RX ADMIN — SUGAMMADEX 200 MG: 100 INJECTION, SOLUTION INTRAVENOUS at 10:43

## 2024-02-13 RX ADMIN — LIDOCAINE HYDROCHLORIDE 50 MG: 20 INJECTION INTRAVENOUS at 09:11

## 2024-02-13 RX ADMIN — ROCURONIUM BROMIDE 20 MG: 10 INJECTION, SOLUTION INTRAVENOUS at 09:37

## 2024-02-13 RX ADMIN — HYDROMORPHONE HYDROCHLORIDE 1 MG: 2 INJECTION, SOLUTION INTRAMUSCULAR; INTRAVENOUS; SUBCUTANEOUS at 10:05

## 2024-02-13 RX ADMIN — SODIUM CHLORIDE, POTASSIUM CHLORIDE, SODIUM LACTATE AND CALCIUM CHLORIDE 75 ML/HR: 600; 310; 30; 20 INJECTION, SOLUTION INTRAVENOUS at 07:42

## 2024-02-13 RX ADMIN — LIDOCAINE HYDROCHLORIDE 50 MG: 20 INJECTION INTRAVENOUS at 08:40

## 2024-02-13 RX ADMIN — ONDANSETRON 4 MG: 2 INJECTION INTRAMUSCULAR; INTRAVENOUS at 12:08

## 2024-02-13 RX ADMIN — CEFAZOLIN SODIUM 2 G: 2 INJECTION, SOLUTION INTRAVENOUS at 15:50

## 2024-02-13 RX ADMIN — SODIUM CHLORIDE, POTASSIUM CHLORIDE, SODIUM LACTATE AND CALCIUM CHLORIDE 100 ML/HR: 600; 310; 30; 20 INJECTION, SOLUTION INTRAVENOUS at 12:11

## 2024-02-13 RX ADMIN — SODIUM CHLORIDE, SODIUM LACTATE, POTASSIUM CHLORIDE, AND CALCIUM CHLORIDE: .6; .31; .03; .02 INJECTION, SOLUTION INTRAVENOUS at 08:27

## 2024-02-13 RX ADMIN — CEFAZOLIN SODIUM 2 G: 2 INJECTION, SOLUTION INTRAVENOUS at 08:44

## 2024-02-13 RX ADMIN — HYDROMORPHONE HYDROCHLORIDE 1 MG: 2 INJECTION, SOLUTION INTRAMUSCULAR; INTRAVENOUS; SUBCUTANEOUS at 10:14

## 2024-02-13 RX ADMIN — TRANEXAMIC ACID 1000 MG: 10 INJECTION, SOLUTION INTRAVENOUS at 09:50

## 2024-02-13 RX ADMIN — TRANEXAMIC ACID 1000 MG: 10 INJECTION, SOLUTION INTRAVENOUS at 08:59

## 2024-02-13 RX ADMIN — PROPOFOL 150 MG: 10 INJECTION, EMULSION INTRAVENOUS at 08:40

## 2024-02-13 SDOH — HEALTH STABILITY: MENTAL HEALTH: CURRENT SMOKER: 0

## 2024-02-13 ASSESSMENT — PAIN SCALES - GENERAL
PAINLEVEL_OUTOF10: 3
PAINLEVEL_OUTOF10: 6
PAINLEVEL_OUTOF10: 9
PAINLEVEL_OUTOF10: 7
PAINLEVEL_OUTOF10: 0 - NO PAIN
PAINLEVEL_OUTOF10: 7
PAINLEVEL_OUTOF10: 0 - NO PAIN
PAINLEVEL_OUTOF10: 5 - MODERATE PAIN
PAINLEVEL_OUTOF10: 5 - MODERATE PAIN
PAINLEVEL_OUTOF10: 0 - NO PAIN
PAINLEVEL_OUTOF10: 6
PAINLEVEL_OUTOF10: 6
PAINLEVEL_OUTOF10: 0 - NO PAIN
PAINLEVEL_OUTOF10: 0 - NO PAIN
PAINLEVEL_OUTOF10: 4

## 2024-02-13 ASSESSMENT — PAIN - FUNCTIONAL ASSESSMENT

## 2024-02-13 ASSESSMENT — COGNITIVE AND FUNCTIONAL STATUS - GENERAL
HELP NEEDED FOR BATHING: A LITTLE
STANDING UP FROM CHAIR USING ARMS: A LITTLE
CLIMB 3 TO 5 STEPS WITH RAILING: A LITTLE
MOBILITY SCORE: 19
MOVING TO AND FROM BED TO CHAIR: A LITTLE
TOILETING: A LITTLE
DRESSING REGULAR LOWER BODY CLOTHING: A LITTLE
TURNING FROM BACK TO SIDE WHILE IN FLAT BAD: A LITTLE
DAILY ACTIVITIY SCORE: 21
WALKING IN HOSPITAL ROOM: A LITTLE

## 2024-02-13 ASSESSMENT — COLUMBIA-SUICIDE SEVERITY RATING SCALE - C-SSRS
1. IN THE PAST MONTH, HAVE YOU WISHED YOU WERE DEAD OR WISHED YOU COULD GO TO SLEEP AND NOT WAKE UP?: NO
2. HAVE YOU ACTUALLY HAD ANY THOUGHTS OF KILLING YOURSELF?: NO
6. HAVE YOU EVER DONE ANYTHING, STARTED TO DO ANYTHING, OR PREPARED TO DO ANYTHING TO END YOUR LIFE?: NO

## 2024-02-13 ASSESSMENT — ACTIVITIES OF DAILY LIVING (ADL)
HOME_MANAGEMENT_TIME_ENTRY: 17
ADL_ASSISTANCE: INDEPENDENT
BATHING_ASSISTANCE: MINIMAL

## 2024-02-13 ASSESSMENT — PAIN DESCRIPTION - DESCRIPTORS: DESCRIPTORS: ACHING

## 2024-02-13 NOTE — SIGNIFICANT EVENT
Patient attended pre op educational TJR class prior to previous surgery 1/22.  RAPT score was 9.  Reviewed pre op CBC & CMP results.    Discussed discharge plan with patient.  For discharge home today with in home therapy follow up.  Verified with patient she plans to utilize [facility] therapy as arranged by surgeon's office.  Patient instructed to call contact number upon discharge to arrange appointment for tomorrow.    Reviewed discharge instructions with patient & pt's . Patient verbalizes understanding of activity, wound care, pain management, and follow up care.  TJR Zone form, pain medication administration form and Aquacel instruction sheet provided to patient.

## 2024-02-13 NOTE — ANESTHESIA PROCEDURE NOTES
Peripheral Block    Patient location during procedure: pre-op  Start time: 2/13/2024 9:10 AM  End time: 2/13/2024 9:20 AM  Reason for block: at surgeon's request and post-op pain management  Staffing  Performed: attending   Authorized by: Armond Lee MD    Performed by: Armond Lee MD  Preanesthetic Checklist  Completed: patient identified, IV checked, site marked, risks and benefits discussed, surgical consent, monitors and equipment checked, pre-op evaluation and timeout performed   Timeout performed at: 2/13/2024 9:10 AM  Peripheral Block  Patient position: laying flat  Prep: ChloraPrep  Patient monitoring: heart rate, cardiac monitor and continuous pulse ox  Laterality: right  Injection technique: single-shot  Needle  Needle type: short-bevel   Needle gauge: 22 G  Needle length: 8 cm  Catheter at skin depth: 4 cm  Test dose: negative  Assessment  Injection assessment: negative aspiration for heme, no paresthesia on injection, incremental injection and local visualized surrounding nerve on ultrasound  Paresthesia pain: none  Heart rate change: no  Slow fractionated injection: yes  Additional Notes  Pt. Refused Block.  NO BLOCK PERFORMED

## 2024-02-13 NOTE — ANESTHESIA PROCEDURE NOTES
Airway  Date/Time: 2/13/2024 8:40 AM  Urgency: elective    Airway not difficult    Staffing  Performed: attending and resident   Authorized by: Armond Lee MD    Performed by: TRA Oconnor  Patient location during procedure: OR    Indications and Patient Condition  Indications for airway management: anesthesia and airway protection  Spontaneous ventilation: present  Sedation level: deep  Preoxygenated: yes  MILS maintained throughout  Mask difficulty assessment: 1 - vent by mask    Final Airway Details  Final airway type: endotracheal airway      Successful airway: ETT  Cuffed: yes   Successful intubation technique: video laryngoscopy  Facilitating devices/methods: cricoid pressure  Endotracheal tube insertion site: oral  Blade: Gamaliel  Blade size: #3  ETT size (mm): 7.0  Cormack-Lehane Classification: grade I - full view of glottis  Placement verified by: chest auscultation and capnometry   Measured from: teeth  ETT to teeth (cm): 19  Number of attempts at approach: 1  Number of other approaches attempted: 0    Additional Comments  Elective Glidescope #3 used by paramedic student

## 2024-02-13 NOTE — ANESTHESIA POSTPROCEDURE EVALUATION
Patient: Mihaela Reaves    Procedure Summary       Date: 02/13/24 Room / Location: [facility] 06 / [facility]    Anesthesia Start: 0829 Anesthesia Stop: 1055    Procedure: RIGHT TOTAL KNEE REPLACEMENT (Right: Knee) Diagnosis:       Arthritis of right knee      (RIGHT KNEE ARTHRITIS)    Surgeons: Wale Drake MD Responsible Provider: Armond Lee MD    Anesthesia Type: general ASA Status: 2            Anesthesia Type: general    Vitals Value Taken Time   /63 02/13/24 1055   Temp 36.2 02/13/24 1055   Pulse 103 02/13/24 1055   Resp 14 02/13/24 1055   SpO2 97 02/13/24 1055       Anesthesia Post Evaluation    Patient participation: complete - patient participated  Level of consciousness: awake  Pain management: adequate  Airway patency: patent  Cardiovascular status: acceptable  Respiratory status: acceptable  Hydration status: acceptable  Postoperative Nausea and Vomiting: none        No notable events documented.

## 2024-02-13 NOTE — ANESTHESIA PREPROCEDURE EVALUATION
Patient: Mihaela Reaves    Procedure Information       Date/Time: 02/13/24 0830    Procedure: RIGHT TOTAL KNEE REPLACEMENT (Right: Knee) - RIGHT TOTAL KNEE REPLACEMENT   SAME DAY DISCHARGE  SCHEDULE AT 730AM    Location: [facility] / [facility]    Surgeons: Wale Drake MD            Relevant Problems   Anesthesia (within normal limits)      Cardiovascular   (+) HTN (hypertension)      Other   (+) Arthritis of right knee       Clinical information reviewed:   Tobacco  Allergies  Meds   Med Hx  Surg Hx  OB Status  Fam Hx  Soc   Hx        NPO Detail:  NPO/Void Status  Date of Last Liquid: 02/12/24  Time of Last Liquid: 2330  Date of Last Solid: 02/12/24  Time of Last Solid: 2245         Physical Exam    Airway  Mallampati: III  TM distance: >3 FB  Neck ROM: full     Cardiovascular - normal exam     Dental - normal exam     Pulmonary - normal exam     Abdominal   (+) obese             Anesthesia Plan    History of general anesthesia?: yes  History of complications of general anesthesia?: no    ASA 2     general     The patient is not a current smoker.  Patient was previously instructed to abstain from smoking on day of procedure.  Patient did not smoke on day of procedure.    intravenous induction   Postoperative administration of opioids is intended.  Trial extubation is planned.  Anesthetic plan and risks discussed with patient.  Use of blood products discussed with patient who consented to blood products.    Plan discussed with CAA.

## 2024-02-13 NOTE — OP NOTE
PRE OP DIAGNOSIS - OA RT KNEE    POST OP DIAGNOSIS- OA RT KNEE    PROCEDURE - RT TKR    SURGEON- VAN TORRES MD    ASSIST-    KYLEE OSULLIVAN PA-C    ANESTHESIA- GENERAL    BLOOD LOSS   150 CC    FINDINGS- SEVERE TRICOMPARTMENT OA    TOURNIQUET- NONE    DRAIN- NONE    IMPLANTS  j&j PFC    FEMUR 4N PS     TIBIAL BASE PLATE  3   INSERT   8 MM PS    PATELLA  35    OPERATIVE PROCEDURE    PATIENT WAS TAKEN TO THE OPERATING ROOM. UNDER ADEQUATE SPINAL ANESTHESIA, THE PATIENT WAS PLACED SUPINE ON THE TABLE AND THE RIGHT LEG WAS PREPPED AND DRAPED IN A STERILE MANNER.  SHE HAD ABOUT 25 DEGREE FLEXION CONTRACTURE PRE OP. NO TOURNIQUET WAS USED. THE KNEE WAS FLEXED TO 90 DEGREES AND ANTERIOR MIDLINE INCISION WAS MADE THROUGH THE SKIN, SUBCUTANEOUS TISSUE AND A MEDIAL DISSECTION WAS MADE.  A MEDIAL PARAPATELLAR CAPSULOTOMY WAS MADE AND THE PATELLA WAS EVERTED LATERALLY.  THE INFRAPATELLAR FAT PAD WAS EXCISED AND A MEDIAL RELEASE WAS PERFORMED.  THE ACL, PCL AND MEDIAL AND LATERAL MENISCI WERE EXCISED.  A DRILL WAS USED TO MAKE A HOLE IN THE DISTAL FEMUR WITH THE JIG SET AT 5 DEGREES VALGUS.  THE DISTAL FEMORAL CUT WAS MADE AND SIZED TO THE ABOVE IMPLANT. THE ANTERIOR, POSTERIOR CHAMFER AND BOX CUTS WERE MADE.  I TRIALED THE ABOVE SIZED FEMUR. NEXT A DRILL WAS USE TO ENTER THE PROXIMAL TIBIA AND THE TIBIA WAS CUT USING A 3 DEGREE POSTERIOR SLOPE BLOCK AND SIZED TO THE ABOVE IMPLANT.  A TRIAL REDUCTION WAS MADE WITH THE ABOVE FEMUR, TIBIA AND INSERT. THE PATELLA WAS CUT FREE HAND AND SIZED TO THE ABOVE IMPLANT.  THE KNEE HAD GOOD RANGE OF MOTION, GOOD MEDIAL AND LATERAL STABILITY AND GOOD PATELLAR TRACKING.  THE TRIAL COMPONENTS WERE REMOVED AND THE KNEE WAS IRRIGATED WITH PULSE IRRIGATION AND AN ORTHOPAEDIC COCKTAIL WAS INJECTED INTO THE POSTERIOR CAPSULE.  USING THE APPROPRIATE CEMENT AND THE J&J PFC SYSTEM THE ABOVE IMPLANTS WERE CEMENTED AND THE KNEE WAS PLACED IN FULL EXTENSION UNTIL THE CEMENT HARDENED.  EXCESS CEMENT  WAS REMOVED.  AGAIN THE KNEE HAD GOOD RANGE OF MOTION, GOOD STABILITY AND GOOD PATELLA TRACKING. THE KNEE WAS THEN IRRIGATED.  THE DEEP TISSUES WERE CLOSED WITH  #1 VICRYL, THE SUBCUTANEOUS TISSUE WAS CLOSED WITH 2-0 VICRYL AND THE SKIN WAS CLOSED WITH STAPLES.  A DRY STERILE BANDAGE WAS APPLIED AND THE PATIENT RETURNED TO THE RECOVERY ROOM IN SATISFACTORY CONDITION.    THE EXCELLENT ASSISTANCE OF CERTIFIED PA WAS NECESSARY FOR SUCCESSFUL OUTCOME OF THIS SURGERY INCLUDING PREP, EXPOSURE, RETRACTING AND CLOSURE    DICTATED BY DR. VAN TORRES

## 2024-02-13 NOTE — PROGRESS NOTES
Occupational Therapy    Evaluation    Patient Name: Mihaela Reaves  MRN: [id number]  Today's Date: 2/13/2024  Time Calculation  Start Time: 1440  Stop Time: 1529  Time Calculation (min): 49 min  15 minute eval and 34 minute treatment.    Assessment  IP OT Assessment  OT Assessment: IMPAIRED BALANCE, AND FUNCTIONAL MOBILTY DUE TO PAIN  Barriers to Discharge: None  Evaluation/Treatment Tolerance: Patient tolerated treatment well (TREATMENT SESSION FOR ADL RETRAINING LASTED 34 minutes FOR LB DRESSING AND CAR TRANSFER TRAINING AND ISSUING AND FITTING AIYANA HOSE. UB DRS- SBA, LB DSG-SBA WITH USE OF DSG STICK, AIYANA HOSE MAX ASSIST. CAR TRSF SIMULATED WITH CGA. FUNCT MOB. BED TO CHAIR.)  End of Session Communication: Bedside nurse, Care Coordinator  End of Session Patient Position: Up in chair    Plan:  Treatment Interventions: ADL retraining, Functional transfer training, Patient/family training  OT Frequency: Daily  OT Discharge Recommendations: Low intensity level of continued care  Equipment Recommended upon Discharge:  (RECOMMEND TUB BENCH.)  OT - OK to Discharge: Yes (YES WHEN MEDICALLY STABLE.)    Subjective     Current Problem:  1. Arthritis of right knee  Pulse oximetry, spot    Insert and maintain peripheral IV    Saline lock IV    lactated Ringer's infusion    ceFAZolin in dextrose (iso-os) (Ancef) IVPB 2 g    tranexamic acid in NaCl,iso-os 1,000 mg/100 mL (10 mg/mL) IV 1,000 mg    Admit to inpatient    Full code    Pulse oximetry, spot    Insert and maintain peripheral IV    Saline lock IV    Admit to inpatient    Full code    Surgical Pathology Exam    Surgical Pathology Exam    tranexamic acid in NaCl,iso-os 1,000 mg/100 mL (10 mg/mL) IV 1,000 mg    morphine 8 mg, ketorolac (Toradol) 30 mg, lidocaine-epinephrine (Xylocaine W/EPI) 1 %-1:100,000 20 mL, ropivacaine (Naropin) 100 mg in sodium chloride 0.9% 49 mL injection    acetaminophen (Tylenol) 325 mg tablet    polyethylene glycol (Glycolax, Miralax) 17 gram packet     ondansetron (Zofran) 4 mg tablet    CANCELED: Vital Signs    CANCELED: Apply AIYANA hose knee length    CANCELED: Apply sequential compression device    CANCELED: Nerve Block    CANCELED: Vital Signs    CANCELED: Apply AIYANA hose knee length    CANCELED: Apply sequential compression device    CANCELED: Nerve Block      2. Pre-op testing  Staphylococcus aureus/MRSA colonization, Culture      3. Acute post-operative pain  oxyCODONE (Roxicodone) 5 mg immediate release tablet    apixaban (Eliquis) 2.5 mg tablet          General:  General  Reason for Referral: OT TO EVAL AND TREAT FOR ADLS AND SAFETY ASSESSMENT S/P RTKR.  Past Medical History Relevant to Rehab: ARTHRITIS.  Family/Caregiver Present:  ( PRESENT DURING EVAL AND TREATMENT SESSION AND FOR FAMILY TRAINING.)  Co-Treatment: PT  Co-Treatment Reason: MAXIMIZE PT SAFETY S/P SURGERY  Prior to Session Communication: Bedside nurse  Patient Position Received: On cart  General Comment: ATTEMPTED TO SEE PT EARLIER AT 1:30, BUT PT WAS TO [place], COLLABORATED WITH PT AND STAFF AND AGREED TO TRY AGAIN AN HOUR LATER. (PT MORE ALERT THIS SESSION AND AGREEABLE TO PARTICIPATE WITH THERAPY.)    Precautions:       Vital Signs:  BP: 166/75  Patient Position: Lying    Pain:  Pain Assessment  Pain Assessment: 0-10  Pain Score: 9  Pain Type: Surgical pain  Pain Location: Knee  Pain Frequency:  (WITH MOVEMENT)    Objective     Cognition:  Overall Cognitive Status: Within Functional Limits       Home Living:  Type of Home: House  Lives With: Spouse  Home Adaptive Equipment: Walker rolling or standard, Cane, Reacher  Home Layout: Two level (BED AND FULL BATH ON 2ND FLOOR, HOWEVER, PT WILL BE SLEEPING ON COUCH INITIATALLY.)  Home Access: Stairs to enter with rails  Entrance Stairs-Number of Steps: 2  Bathroom Shower/Tub: Tub/shower unit  Bathroom Toilet: Handicapped height     Prior Function:  Level of [place]: Independent with ADLs and functional transfers  Receives Help  From: Family  ADL Assistance: Independent  Ambulatory Assistance: Independent  Prior Function Comments: PT DID NOT USE AD PTA.    IADL History:  Homemaking Responsibilities:  (SHARED)    ADL:  Eating Assistance: Independent  Grooming Assistance: Stand by  Bathing Assistance: Minimal  UE Dressing Assistance: Stand by  LE Dressing Assistance: Minimal  LE Dressing Deficit:  (MAX ASSIST FOR AIYANA HOSE)  Toileting Deficit: Setup  Functional Assistance: Minimal    Activity Tolerance:  Endurance: Tolerates 10 - 20 min exercise with multiple rests    Bed Mobility/Transfers:   Bed Mobility  Bed Mobility:  (SUPINE TO SIT OFF CART TO EDGE OF CARE WITH SBA)  Transfers  Transfer:  (SIT TO STAND CGA FROM CHAIR AND FROM CART.)    Ambulation/Gait Training:  Ambulation/Gait Training  Ambulation/Gait Training Performed:  (FUNCTIONAL MOBILITY WITH WHEELED WALKER WITH CGA.)    Sitting Balance:  Static Sitting Balance  Static Sitting-Balance Support:  (GOOD.)    Standing Balance:  Static Standing Balance  Static Standing-Balance Support:  (CGA WITH WALKER)    Vision:     and      Sensation:  Light Touch: No apparent deficits    Strength:  Strength Comments: BUE WFL      Coordination:  Finger to Target: Intact     Hand Function:  Hand Function  Gross Grasp: Functional    Extremities: RUE   RUE : Within Functional Limits and LUE   LUE: Within Functional Limits    Outcome Measures: [facility] Daily Activity  Putting on and taking off regular lower body clothing: A little  Bathing (including washing, rinsing, drying): A little  Putting on and taking off regular upper body clothing: None  Toileting, which includes using toilet, bedpan or urinal: A little  Taking care of personal grooming such as brushing teeth: None  Eating Meals: None  Daily Activity - Total Score: 21          EDUCATION:  Education  Individual(s) Educated: Spouse, Patient  Equipment:  (AIYANA HOSE SCHEDULE AND DONNING AND DOFFING TECHNQIUE, CAR TRANSFER TRAINING.)  Education  Documentation  Body Mechanics, taught by Bharati Jones OT at 2/13/2024  4:08 PM.  Learner: Significant Other, Patient  Readiness: Eager  Method: Explanation, Demonstration, Teach-back  Response: Verbalizes Understanding    Precautions, taught by Bharati Jones OT at 2/13/2024  4:08 PM.  Learner: Significant Other, Patient  Readiness: Eager  Method: Explanation, Demonstration, Teach-back  Response: Verbalizes Understanding    Home Exercise Program, taught by Bharati Jones OT at 2/13/2024  4:08 PM.  Learner: Significant Other, Patient  Readiness: Eager  Method: Explanation, Demonstration, Teach-back  Response: Verbalizes Understanding    ADL Training, taught by Bharati Jones OT at 2/13/2024  4:08 PM.  Learner: Significant Other, Patient  Readiness: Eager  Method: Explanation, Demonstration, Teach-back  Response: Verbalizes Understanding    Education Comments  No comments found.        Goals:   Encounter Problems       Encounter Problems (Active)       Dressing Upper Extremities       STG - Patient will dress upper body with independent after set up.  (Progressing)       Start:  02/13/24    Expected End:  02/14/24               Dressings Lower Extremities       STG - Patient will lois/doff pants over feet with ae as needed with sba.  (Progressing)       Start:  02/13/24    Expected End:  02/14/24               Grooming       STG - Patient completes grooming with set up.  (Progressing)       Start:  02/13/24    Expected End:  02/14/24               Mobility       STG - Patient will ambulate with least restrictive device with  with cga (Progressing)       Start:  02/13/24    Expected End:  02/14/24               Transfers       STG - Patient will transfer sit to and from stand with sba (Progressing)       Start:  02/13/24    Expected End:  02/14/24            STG - Patient will perform car transfer with cga.  (Progressing)       Start:  02/13/24    Expected End:  02/14/24

## 2024-02-13 NOTE — DISCHARGE INSTRUCTIONS
PAIN MANAGEMENT AT HOME:  To provide the best pain control over the next few days when pain will be at it's worst, we suggest you continue to take the following medications routinely and separately to provide the best pain management.  In addition, apply ice VERY FREQUENTLY to incision.   Also use some type of alternative intervention such as music therapy, relaxation techniques, or an type of diversion (such as watching television or talking to a friend) to help control pain.  Do NOT take more than 4000mg of acetaminophen (tylenol) in 24 hours.        CONSTIPATION MANAGEMENT AT HOME:  Constipation is common after Joint Replacement surgery for several reasons.  A common side effect of all pain medication is constipation.  A decrease in your activity as well as change in your normal diet & appetite all contribute to the constipation.  At home, it is important to take a daily stool softener such as Colace, Milk of magnesium or senokot while you are taking pain medications to help manage the constipation.  In addition, if you do NOT have a bowel movement within 72 hours of discharge, add a laxative such as miralax.  Miralax normally takes 2 to 3 days to work so you will not receive immediate results.  It is also important to drink plenty of fluids, especially water.  Stool softeners & laxatives need water to work properly.  We also suggest you increase your fiber intake either with foods high in fiber or with some type of supplement such as benefiber or metamucil.    Foods that are high in fiber include:     Fruits such as pears, strawberries, avocado, apples, raspberries, bananas, kiwis   Vegetables such as carrots, beets, broccoli, brussel sprouts, spinach   Lentils and kidney beans   Split peas and chickpeas   Oats, almonds, cynthia seeds, and sweet potatoes      ACTIVITY AT HOME:  You will need to continue with your therapy after discharge either with in home therapy or at an outpatient facility.  Most patients  initially do in home therapy for about two weeks then transition to outpatient therapy.  You have freedom of choice in which in home therapy and outpatient facility you plan to use.  Most in home therapy sessions will begin within 24 to 48 hours after discharge.  After about two weeks of in home therapy, you will most likely to ready for outpatient therapy sessions.  Outpatient therapy is normally twice a week for weeks.  While you are at home it is important that you perform the exercises the therapist went over with you in the hospital 2 to 3 times a day.  After you complete your exercises, apply ice to your incision to help with swelling and pain.  You should also be getting up and walking around your house every hour with the use of your walker.  While at rest, perform, ankle pumps on each foot at least ten times.  This will help with the swelling as well as decrease your risk for blood clots.  ALWAYS USE YOUR WALKER WITH ANY TYPE OF ACTIVITY!!!!    WOUND CARE:  The bandage on your incision will stay in place for 7 days.  The bandage is waterproof so you may shower with the bandage in place.  No need to wrap or cover it.  Some drainage on your bandage is perfectly normal.  Home health care will assist you with bandage removal.  Once the bandage is removed, your incision can be left open to air if there is no drainage present.  If your incision is draining at the time of bandage removal, home health care will assist you with applying a new gauze bandage.  Gauze bandages are NOT waterproof.    Swelling in your operative extremity will peak about 72 hours after surgery and can last for weeks.  To help with the swelling make sure you are elevating your leg and apply ice frequently.  In addition, you will receive compression stockings upon discharge from the hospital.  Make sure you are wearing these stockings on both your legs at home.  Generally we instruct you to wear during the day and remove at bedtime for the  next 4 weeks.      *Apply ice to incision at least 5 times daily    *Wear bilateral alondra hose stockings to lower extremities for next 4 weeks    *Do NOT take any non steroidal anti-inflammatory medications such as motrin, aleve, ibuprofen, celebrex or meloxicam    *Take daily stool softener.  If you experience any diarrhea, stop medication    *If you need a refill on your pain medication, contact your surgeon's office Monday through Thursday with as least 24 hours notice    If you have any questions or concerns please contact the Joint  Bernadine Thompson at:  Office: [phone]   Cell:  [phone]  Email:  [email or web address]

## 2024-02-14 ENCOUNTER — TELEPHONE (OUTPATIENT)
Dept: ORTHOPEDICS | Facility: HOSPITAL | Age: 64
End: 2024-02-14
Payer: COMMERCIAL

## 2024-02-14 NOTE — TELEPHONE ENCOUNTER
Routine discharge follow up phone call completed.  States in home therapist is not able to see patient until tomorrow.  Encouraged patient to perform daily exercises at home as instructed by therapy 3x daily.

## 2024-02-21 LAB
LABORATORY COMMENT REPORT: NORMAL
PATH REPORT.FINAL DX SPEC: NORMAL
PATH REPORT.GROSS SPEC: NORMAL
PATH REPORT.RELEVANT HX SPEC: NORMAL
PATH REPORT.TOTAL CANCER: NORMAL

## 2024-04-16 ENCOUNTER — LAB (OUTPATIENT)
Dept: LAB | Facility: LAB | Age: 64
End: 2024-04-16
Payer: COMMERCIAL

## 2024-04-16 DIAGNOSIS — G50.0 TRIGEMINAL NEURALGIA: Primary | ICD-10-CM

## 2024-04-16 LAB
BUN SERPL-MCNC: 16 MG/DL (ref 6–23)
CREAT SERPL-MCNC: 0.71 MG/DL (ref 0.5–1.05)
EGFRCR SERPLBLD CKD-EPI 2021: >90 ML/MIN/1.73M*2

## 2024-04-16 PROCEDURE — 82565 ASSAY OF CREATININE: CPT

## 2024-04-16 PROCEDURE — 84520 ASSAY OF UREA NITROGEN: CPT

## 2024-04-16 PROCEDURE — 36415 COLL VENOUS BLD VENIPUNCTURE: CPT

## 2024-07-11 ENCOUNTER — APPOINTMENT (OUTPATIENT)
Dept: VASCULAR SURGERY | Facility: CLINIC | Age: 64
End: 2024-07-11
Payer: COMMERCIAL

## 2024-07-11 VITALS
WEIGHT: 216 LBS | OXYGEN SATURATION: 98 % | DIASTOLIC BLOOD PRESSURE: 62 MMHG | SYSTOLIC BLOOD PRESSURE: 118 MMHG | BODY MASS INDEX: 34.72 KG/M2 | HEART RATE: 71 BPM | HEIGHT: 66 IN

## 2024-07-11 DIAGNOSIS — I87.2 CHRONIC VENOUS INSUFFICIENCY OF LOWER EXTREMITY: Primary | ICD-10-CM

## 2024-07-11 PROCEDURE — 1036F TOBACCO NON-USER: CPT | Performed by: SURGERY

## 2024-07-11 PROCEDURE — 3078F DIAST BP <80 MM HG: CPT | Performed by: SURGERY

## 2024-07-11 PROCEDURE — 3074F SYST BP LT 130 MM HG: CPT | Performed by: SURGERY

## 2024-07-11 PROCEDURE — 99213 OFFICE O/P EST LOW 20 MIN: CPT | Performed by: SURGERY

## 2024-07-11 RX ORDER — PREGABALIN 150 MG/1
150 CAPSULE ORAL 2 TIMES DAILY
COMMUNITY
Start: 2024-05-31

## 2024-07-11 NOTE — PROGRESS NOTES
"Mihaela Reaves is a 64 y.o. female     Subjective   This patient was seen and evaluated in the office today for her venous issues of her lower extremities.  She had a right total knee replacement in February of this year.  She states that she is doing extremely well.  She is obese at 5 foot 6 and 216 pounds.  She has never smoked and she is currently not a diabetic.  She states that she is now on a walking program 3-4 times per week and is able to walk a mile at a time.  She is extremely happy about this.  She is on Lyrica for some trigeminal nerve pain.  She denies any significant consistent swelling of her lower extremities.  She denies any fever chills nausea vomiting or headache         Objective     Vitals:    07/11/24 0700   BP: 118/62   Pulse: 71   SpO2: 98%      Physical Exam  This patient is alert and oriented x3 her head is normocephalic neck is soft and supple without palpable lymph nodes.  Heart is regular rate.  Lungs are clear.  Abdomen soft with positive bowel sounds.  There is no pain on palpation.  Upper and lower extremities have adequate range of motion with palpable brachial radial femoral and popliteal pulses.  Skin turgor is adequate.  Psychologically the patient appears to be acting appropriately.  She does have palpable pedal pulses.  Her lower leg volume appears under control  Blood pressure 118/62, pulse 71, height 1.676 m (5' 6\"), weight 98 kg (216 lb), SpO2 98%.            Patient Active Problem List    Diagnosis Date Noted    Arthritis of right knee 02/13/2024    Acute post-operative pain 02/13/2024    HTN (hypertension) 02/13/2024    Chronic venous insufficiency of lower extremity 01/04/2024          Current Outpatient Medications:     pregabalin (Lyrica) 150 mg capsule, Take 1 capsule (150 mg) by mouth 2 times a day., Disp: , Rfl:     acetaminophen (Tylenol) 325 mg tablet, Take 2 tablets (650 mg) by mouth every 6 hours., Disp: , Rfl:     apixaban (Eliquis) 2.5 mg tablet, Take 1 tablet " (2.5 mg) by mouth 2 times a day., Disp: 60 tablet, Rfl: 0    ondansetron (Zofran) 4 mg tablet, Take 1 tablet (4 mg) by mouth every 8 hours if needed for nausea or vomiting., Disp: 20 tablet, Rfl: 0    polyethylene glycol (Glycolax, Miralax) 17 gram packet, Take 17 g by mouth once daily., Disp: , Rfl:     telmisartan-amlodipine (Twynsta) 80-10 mg tablet, Take 1 tablet by mouth once daily., Disp: , Rfl:      Lab Results   Component Value Date    WBC 5.1 02/06/2024    HGB 14.4 02/06/2024    HCT 42.8 02/06/2024     02/06/2024    ALT 14 02/06/2024    AST 18 02/06/2024     02/06/2024    K 4.3 02/06/2024     02/06/2024    CREATININE 0.71 04/16/2024    BUN 16 04/16/2024    CO2 25 02/06/2024    INR 1.0 02/06/2024       No results found.              Assessment/Plan   Active Problems:  There are no active Hospital Problems.      This patient presents today for follow-up of her venous issues and lower extremity swelling.  She states that her swelling has been very well under control.  She did have a total right knee replacement in February of this year.  She states that she is doing extremely well.  She does a walking program now 3-4 times per week and is able to now walk a mile at a time.  She has never smoked and she is currently not a diabetic and she does have palpable pedal pulses.  Her lower leg volume is well under control.  I am encouraging her to consistently wear her compression stockings and periodically elevate her legs and to exercise her lower extremities to the best of her ability on a daily basis.  I would like her to follow-up in 6 months.  No testing needed at this time      Michael Weinstein, DO

## 2024-08-29 ENCOUNTER — HOSPITAL ENCOUNTER (OUTPATIENT)
Dept: RADIOLOGY | Facility: CLINIC | Age: 64
Discharge: HOME | End: 2024-08-29
Payer: COMMERCIAL

## 2024-08-29 VITALS — WEIGHT: 216.05 LBS | HEIGHT: 66 IN | BODY MASS INDEX: 34.72 KG/M2

## 2024-08-29 DIAGNOSIS — Z12.31 SCREENING MAMMOGRAM FOR BREAST CANCER: ICD-10-CM

## 2024-08-29 PROCEDURE — 77067 SCR MAMMO BI INCL CAD: CPT

## 2024-08-29 PROCEDURE — 77067 SCR MAMMO BI INCL CAD: CPT | Performed by: RADIOLOGY

## 2024-08-29 PROCEDURE — 77063 BREAST TOMOSYNTHESIS BI: CPT | Performed by: RADIOLOGY

## 2024-10-28 ENCOUNTER — HOSPITAL ENCOUNTER (OUTPATIENT)
Dept: RADIOLOGY | Facility: CLINIC | Age: 64
Discharge: HOME | End: 2024-10-28
Payer: COMMERCIAL

## 2024-10-28 DIAGNOSIS — R92.8 OTHER ABNORMAL AND INCONCLUSIVE FINDINGS ON DIAGNOSTIC IMAGING OF BREAST: ICD-10-CM

## 2024-10-28 PROCEDURE — 77061 BREAST TOMOSYNTHESIS UNI: CPT | Mod: LEFT SIDE | Performed by: RADIOLOGY

## 2024-10-28 PROCEDURE — 77065 DX MAMMO INCL CAD UNI: CPT | Mod: LEFT SIDE | Performed by: RADIOLOGY

## 2024-10-28 PROCEDURE — 77065 DX MAMMO INCL CAD UNI: CPT | Mod: LT

## 2024-10-31 ENCOUNTER — OFFICE (OUTPATIENT)
Dept: URBAN - METROPOLITAN AREA CLINIC 25 | Facility: CLINIC | Age: 64
End: 2024-10-31
Payer: COMMERCIAL

## 2024-10-31 VITALS
HEIGHT: 66 IN | WEIGHT: 221 LBS | HEART RATE: 69 BPM | DIASTOLIC BLOOD PRESSURE: 77 MMHG | SYSTOLIC BLOOD PRESSURE: 122 MMHG | TEMPERATURE: 98.3 F

## 2024-10-31 DIAGNOSIS — K92.1 MELENA: ICD-10-CM

## 2024-10-31 PROCEDURE — 99214 OFFICE O/P EST MOD 30 MIN: CPT | Performed by: INTERNAL MEDICINE

## 2024-11-10 ENCOUNTER — OFFICE VISIT (OUTPATIENT)
Dept: URGENT CARE | Age: 64
End: 2024-11-10
Payer: COMMERCIAL

## 2024-11-10 VITALS
SYSTOLIC BLOOD PRESSURE: 102 MMHG | WEIGHT: 212 LBS | OXYGEN SATURATION: 96 % | DIASTOLIC BLOOD PRESSURE: 70 MMHG | TEMPERATURE: 98.8 F | HEIGHT: 66 IN | HEART RATE: 102 BPM | RESPIRATION RATE: 17 BRPM | BODY MASS INDEX: 34.07 KG/M2

## 2024-11-10 DIAGNOSIS — H10.31 ACUTE BACTERIAL CONJUNCTIVITIS OF RIGHT EYE: ICD-10-CM

## 2024-11-10 DIAGNOSIS — R05.1 ACUTE COUGH: Primary | ICD-10-CM

## 2024-11-10 DIAGNOSIS — R09.81 CONGESTION OF NASAL SINUS: ICD-10-CM

## 2024-11-10 LAB
POC RAPID INFLUENZA A: NEGATIVE
POC RAPID INFLUENZA B: NEGATIVE
POC SARS-COV-2 AG BINAX: NORMAL

## 2024-11-10 PROCEDURE — 87804 INFLUENZA ASSAY W/OPTIC: CPT | Performed by: PHYSICIAN ASSISTANT

## 2024-11-10 PROCEDURE — 3008F BODY MASS INDEX DOCD: CPT | Performed by: PHYSICIAN ASSISTANT

## 2024-11-10 PROCEDURE — 87811 SARS-COV-2 COVID19 W/OPTIC: CPT | Performed by: PHYSICIAN ASSISTANT

## 2024-11-10 PROCEDURE — 99203 OFFICE O/P NEW LOW 30 MIN: CPT | Performed by: PHYSICIAN ASSISTANT

## 2024-11-10 PROCEDURE — 3078F DIAST BP <80 MM HG: CPT | Performed by: PHYSICIAN ASSISTANT

## 2024-11-10 PROCEDURE — 3074F SYST BP LT 130 MM HG: CPT | Performed by: PHYSICIAN ASSISTANT

## 2024-11-10 RX ORDER — BENZONATATE 200 MG/1
200 CAPSULE ORAL 3 TIMES DAILY PRN
Qty: 21 CAPSULE | Refills: 0 | Status: SHIPPED | OUTPATIENT
Start: 2024-11-10 | End: 2024-11-17

## 2024-11-10 RX ORDER — POLYMYXIN B SULFATE AND TRIMETHOPRIM 1; 10000 MG/ML; [USP'U]/ML
1 SOLUTION OPHTHALMIC EVERY 6 HOURS
Qty: 10 ML | Refills: 0 | Status: SHIPPED | OUTPATIENT
Start: 2024-11-10 | End: 2024-11-17

## 2024-11-10 RX ORDER — AZITHROMYCIN 250 MG/1
TABLET, FILM COATED ORAL
Qty: 6 TABLET | Refills: 0 | Status: SHIPPED | OUTPATIENT
Start: 2024-11-10

## 2024-11-10 ASSESSMENT — PATIENT HEALTH QUESTIONNAIRE - PHQ9
2. FEELING DOWN, DEPRESSED OR HOPELESS: NOT AT ALL
SUM OF ALL RESPONSES TO PHQ9 QUESTIONS 1 AND 2: 0
SUM OF ALL RESPONSES TO PHQ9 QUESTIONS 1 AND 2: 0
1. LITTLE INTEREST OR PLEASURE IN DOING THINGS: NOT AT ALL
1. LITTLE INTEREST OR PLEASURE IN DOING THINGS: NOT AT ALL
2. FEELING DOWN, DEPRESSED OR HOPELESS: NOT AT ALL

## 2024-11-10 NOTE — LETTER
November 10, 2024     Patient: Mihaela Reaves   YOB: 1960   Date of Visit: 11/10/2024       To Whom It May Concern:    Mihaela Reaves was seen in my clinic on 11/10/2024 at 6:50 pm. Please excuse Mihaela for her absence from work on this day to make the appointment. May return to work after 24-48 hours given eye does not have continue discharge    If you have any questions or concerns, please don't hesitate to call.         Sincerely,         Anayeli Mckeon PA-C        CC: No Recipients

## 2024-11-10 NOTE — PROGRESS NOTES
"Subjective   Patient ID: Mihaela Reaves is a 64 y.o. female. They present today with a chief complaint of Nasal Congestion, Chills, and Eye Problem (Right eye irritation).    History of Present Illness  HPI  65 y/o pt. Presents to clinic with complaints of productive cough with associated nasal congestion, sore throat, SOB, chills ongoing for the past 4 days with new development of right eye irritation and discharge since this morning. Pt. Reports exposure to pneumonia by pts. Grandson. Reports has tried otc medications without relief. Denies chest pain, dizziness, fevers, chills, body aches, nausea, vomiting, abdominal pain, diarrhea, sinus pain.    Past Medical History  Allergies as of 11/10/2024 - Reviewed 11/10/2024   Allergen Reaction Noted    Tetracyclines GI Upset, GI intolerance, Diarrhea, and Other 2014    Sulfa (sulfonamide antibiotics) Hives 2014       (Not in a hospital admission)       Past Medical History:   Diagnosis Date    Arthritis     Colon polyp     GERD (gastroesophageal reflux disease)     Hypertension     Personal history of other diseases of the female genital tract     History of heavy periods       Past Surgical History:   Procedure Laterality Date     SECTION, CLASSIC  2018     Section    HYSTERECTOMY  2012    Hysterectomy    OTHER SURGICAL HISTORY  2022    Knee replacement    TONSILLECTOMY  2018    Tonsillectomy    VEIN SURGERY          reports that she has never smoked. She has never used smokeless tobacco. She reports that she does not drink alcohol.    Review of Systems  Review of Systems  ROS negative with the exception as noted on HPI                              Objective    Vitals:    11/10/24 1854   BP: 102/70   BP Location: Left arm   Patient Position: Sitting   BP Cuff Size: Adult   Pulse: 102   Resp: 17   Temp: 37.1 °C (98.8 °F)   TempSrc: Oral   SpO2: 96%   Weight: 96.2 kg (212 lb)   Height: 1.676 m (5' 6\")     No LMP recorded. " Patient is postmenopausal.    Physical Exam  Constitutional:       Appearance: Normal appearance.   HENT:      Head: Normocephalic and atraumatic.      Right Ear: Ear canal and external ear normal. A middle ear effusion is present.      Left Ear: Ear canal and external ear normal. A middle ear effusion is present.      Nose: Mucosal edema (and erythema) present. No rhinorrhea.      Right Sinus: No maxillary sinus tenderness or frontal sinus tenderness.      Left Sinus: No maxillary sinus tenderness or frontal sinus tenderness.      Mouth/Throat:      Lips: Pink.      Mouth: Mucous membranes are moist. No oral lesions.      Dentition: Normal dentition. No gingival swelling.      Tongue: No lesions. Tongue does not deviate from midline.      Palate: No mass and lesions.      Pharynx: Posterior oropharyngeal erythema and postnasal drip present. No pharyngeal swelling or uvula swelling.   Eyes:      General: No scleral icterus.        Right eye: Discharge present.         Left eye: No discharge.      Conjunctiva/sclera:      Right eye: Right conjunctiva is injected. No exudate or hemorrhage.     Left eye: Left conjunctiva is not injected. No exudate or hemorrhage.     Pupils: Pupils are equal, round, and reactive to light.   Cardiovascular:      Rate and Rhythm: Normal rate and regular rhythm.      Heart sounds: No murmur heard.  Pulmonary:      Effort: Pulmonary effort is normal. No respiratory distress.      Breath sounds: Normal breath sounds. No stridor. No wheezing, rhonchi or rales.   Neurological:      Mental Status: She is alert.         Procedures    Point of Care Test & Imaging Results from this visit  Results for orders placed or performed in visit on 11/10/24   POCT Covid-19 Rapid Antigen   Result Value Ref Range    POC POOJA-COV-2 AG  Presumptive negative test for SARS-CoV-2 (no antigen detected)     Presumptive negative test for SARS-CoV-2 (no antigen detected)   POCT Influenza A/B manually resulted   Result  Value Ref Range    POC Rapid Influenza A Negative Negative    POC Rapid Influenza B Negative Negative      No results found.    Diagnostic study results (if any) were reviewed by Anayeli Mckeon PA-C.    Assessment/Plan   Allergies, medications, history, and pertinent labs/EKGs/Imaging reviewed by Anayeli Mckeon PA-C.   productive cough with associated nasal congestion, sore throat, SOB, chills ongoing for the past 4 days with new development of right eye irritation and discharge since this morning. Benzonatate started. Z- pack prescribed given exposure to pneumonia, however, pt. Is advised to do watchful waiting and may start antibiotic if symptom are worsening over the next 3-5 days. Advised to drink plenty of fluids, run a cool-mist humidifier in room at night, gargle salt water for sore throat, and get plenty of rest. Pt. is advised to take 10 deep breaths and hours and continue to walk around every couple of hours. Patient should avoid over-exertion and reduce exposure to irritants such as smoke, cold, dry air, and dust. Patient may take acetaminophen or ibuprofen as directed to reduce fever and body aches. Antihistamine and decongestant usage was discussed and recommendations made. Risk, benefits, and potential side effects of medication(s) discussed with pt. Discussed disease/illness presentation, treatment options, progression, complications, and outcomes with patient. Pt. Has expressed understanding and is an agreement of plan of care.    Medical Decision Making      Orders and Diagnoses  Diagnoses and all orders for this visit:  Congestion of nasal sinus  -     POCT Covid-19 Rapid Antigen  -     POCT Influenza A/B manually resulted      Medical Admin Record      Patient disposition: Home    Electronically signed by Anayeli Mckeon PA-C  6:59 PM

## 2024-11-11 NOTE — PATIENT INSTRUCTIONS
Warm liquids with honey  Increase fluid intake; encourage electrolytes such as Pedialyte  10 deep breaths an hour  May use tylenol/NSAIDs as needed for fevers, chills, body aches.

## 2024-11-13 ENCOUNTER — OFFICE VISIT (OUTPATIENT)
Dept: URGENT CARE | Age: 64
End: 2024-11-13
Payer: COMMERCIAL

## 2024-11-13 VITALS
HEIGHT: 66 IN | RESPIRATION RATE: 16 BRPM | HEART RATE: 84 BPM | DIASTOLIC BLOOD PRESSURE: 80 MMHG | SYSTOLIC BLOOD PRESSURE: 127 MMHG | OXYGEN SATURATION: 97 % | TEMPERATURE: 97.6 F | WEIGHT: 212 LBS | BODY MASS INDEX: 34.07 KG/M2

## 2024-11-13 DIAGNOSIS — J06.9 VIRAL URI: Primary | ICD-10-CM

## 2024-11-13 DIAGNOSIS — H65.91 RIGHT OTITIS MEDIA WITH EFFUSION: ICD-10-CM

## 2024-11-13 PROCEDURE — 3079F DIAST BP 80-89 MM HG: CPT | Performed by: FAMILY MEDICINE

## 2024-11-13 PROCEDURE — 99213 OFFICE O/P EST LOW 20 MIN: CPT | Performed by: FAMILY MEDICINE

## 2024-11-13 PROCEDURE — 3074F SYST BP LT 130 MM HG: CPT | Performed by: FAMILY MEDICINE

## 2024-11-13 PROCEDURE — 1036F TOBACCO NON-USER: CPT | Performed by: FAMILY MEDICINE

## 2024-11-13 PROCEDURE — 3008F BODY MASS INDEX DOCD: CPT | Performed by: FAMILY MEDICINE

## 2024-11-13 NOTE — PATIENT INSTRUCTIONS
Medication as directed  Increase fluids and rest  Decongestants, such as Mucinex D or Claritin-D and Flonase nasal spray as discussed  Motrin or Tylenol as needed for pain or fever  Gargle with lightly salted warm water several times a day  See PCP in 5 to 7 days if no improvement

## 2024-11-13 NOTE — PROGRESS NOTES
Subjective   Patient ID: Mihaela Reaves is a 64 y.o. female. They present today with a chief complaint of Earache and Clogged Ear (Pt states she felt a sharp pain in her right ear last night and it is blocked today X 1 day. ).    History of Present Illness  HPI  1 Days of right sided ear pain and blockage. Mild nasal congestion with postnasal drip. No fevers or chills. No eye redness, discharge or itching.  No blood or discharge noted from ear.  No ear tenderness.  No nausea vomiting or diarrhea. No chest pain, shortness of breath or wheezing noted. No rashes or skin lesions noted. No known exposures to Mono, strep, pneumonia or influenza. Over-the-counter medications taken for symptoms. Nonsmoker.  Patient was seen here 3 days ago and diagnosed with a respiratory infection for which she was given azithromycin.  She is on day 4 of this.    Past Medical History  Allergies as of 2024 - Reviewed 2024   Allergen Reaction Noted    Tetracyclines GI Upset, GI intolerance, Diarrhea, and Other 2014    Sulfa (sulfonamide antibiotics) Hives 2014       (Not in a hospital admission)       Past Medical History:   Diagnosis Date    Arthritis     Colon polyp     GERD (gastroesophageal reflux disease)     Hypertension     Personal history of other diseases of the female genital tract     History of heavy periods       Past Surgical History:   Procedure Laterality Date     SECTION, CLASSIC  2018     Section    HYSTERECTOMY  2012    Hysterectomy    OTHER SURGICAL HISTORY  2022    Knee replacement    TONSILLECTOMY  2018    Tonsillectomy    VEIN SURGERY          reports that she has never smoked. She has never used smokeless tobacco. She reports that she does not drink alcohol.    Review of Systems  Review of Systems     As in history of present illness                          Objective    Vitals:    24 1312   BP: 127/80   Pulse: 84   Resp: 16   Temp: 36.4 °C (97.6 °F)  "  TempSrc: Oral   SpO2: 97%   Weight: 96.2 kg (212 lb)   Height: 1.676 m (5' 6\")     No LMP recorded. Patient is postmenopausal.    Physical Exam  Gen- A&O. NAD at rest.   Ears-left unremarkable.  Canals and TM's appear right ear canal unremarkable but tympanic membrane dull mildly erythematous with an effusion  OP-no erythema or exudates. Some mucous in posterior OP   Neck- mild anterior lymphadenopathy  Lungs- clear to auscultation without wheezes or rhonchi  Skin-no rashes, hives or lesions  Procedures    Point of Care Test & Imaging Results from this visit  No results found for this visit on 11/13/24.   No results found.    Diagnostic study results (if any) were reviewed by Samuel Cobb MD.    Assessment/Plan   Allergies, medications, history, and pertinent labs/EKGs/Imaging reviewed by Samuel Cobb MD.     Medical Decision Making  At time of discharge patient was clinically well-appearing and HDS for outpatient management. The patient and/or family was educated regarding diagnosis, supportive care, OTC and Rx medications. The patient and/or family was given the opportunity to ask questions prior to discharge.  They verbalized understanding of my discussion of the plans for treatment, expected course, indications to return to  or seek further evaluation in ED, and the need for timely follow up as directed.   They were provided with a work/school excuse if requested.    Orders and Diagnoses  Diagnoses and all orders for this visit:  Viral URI  Right otitis media with effusion      Medical Admin Record      Patient disposition: Home    Electronically signed by Samuel Cobb MD  1:24 PM      "

## 2025-01-15 ENCOUNTER — APPOINTMENT (OUTPATIENT)
Dept: VASCULAR SURGERY | Facility: CLINIC | Age: 65
End: 2025-01-15
Payer: COMMERCIAL

## 2025-01-15 VITALS
BODY MASS INDEX: 35.68 KG/M2 | WEIGHT: 222 LBS | OXYGEN SATURATION: 99 % | HEART RATE: 70 BPM | HEIGHT: 66 IN | SYSTOLIC BLOOD PRESSURE: 120 MMHG | DIASTOLIC BLOOD PRESSURE: 64 MMHG

## 2025-01-15 DIAGNOSIS — I87.2 CHRONIC VENOUS INSUFFICIENCY OF LOWER EXTREMITY: Primary | ICD-10-CM

## 2025-01-15 PROCEDURE — 99213 OFFICE O/P EST LOW 20 MIN: CPT | Performed by: SURGERY

## 2025-01-15 PROCEDURE — 1036F TOBACCO NON-USER: CPT | Performed by: SURGERY

## 2025-01-15 PROCEDURE — 3074F SYST BP LT 130 MM HG: CPT | Performed by: SURGERY

## 2025-01-15 PROCEDURE — 3008F BODY MASS INDEX DOCD: CPT | Performed by: SURGERY

## 2025-01-15 PROCEDURE — 3078F DIAST BP <80 MM HG: CPT | Performed by: SURGERY

## 2025-01-15 NOTE — PROGRESS NOTES
"Mihaela Reaves is a 64 y.o. female     Subjective   This patient presents today for follow-up of her venous issues of her lower extremities.  She states that she is not been wearing her stockings on a consistent basis.  She has also gained weight over the holidays.  She does have some swelling issues of of both legs but denies any significant achiness or heaviness.  She does work 5 days a week.  She denies any fever chills nausea vomiting or headache.         Objective     Vitals:    01/15/25 0700   BP: 120/64   Pulse: 70   SpO2: 99%      Physical Exam  This patient is alert and oriented x 3.  She is in no acute distress.  Head is normocephalic.  Pupils are equal and reactive to light and accommodation.  Neck is soft and supple without palpable lymph nodes.  Heart is regular rate.  Lungs are relatively clear.  Abdomen is obese with positive bowel sounds there is no pain on palpation.  Upper and lower extremities seem to have adequate range of motion.  She has palpable brachial radial femoral and popliteal pulses.  Skin turgor slightly decreased in her lower legs.  Psychologically she seems to be acting appropriately.  Blood pressure 120/64, pulse 70, height 1.676 m (5' 6\"), weight 101 kg (222 lb), SpO2 99%.            Patient Active Problem List    Diagnosis Date Noted    Arthritis of right knee 02/13/2024    Acute post-operative pain 02/13/2024    HTN (hypertension) 02/13/2024    Chronic venous insufficiency of lower extremity 01/04/2024          Current Outpatient Medications:     acetaminophen (Tylenol) 325 mg tablet, Take 2 tablets (650 mg) by mouth every 6 hours., Disp: , Rfl:     apixaban (Eliquis) 2.5 mg tablet, Take 1 tablet (2.5 mg) by mouth 2 times a day., Disp: 60 tablet, Rfl: 0    azithromycin (Zithromax) 250 mg tablet, Take 2 pills on day 1 once daily by mouth, followed by 1 pill once daily x 4 by mouth., Disp: 6 tablet, Rfl: 0    ondansetron (Zofran) 4 mg tablet, Take 1 tablet (4 mg) by mouth every 8 " hours if needed for nausea or vomiting., Disp: 20 tablet, Rfl: 0    polyethylene glycol (Glycolax, Miralax) 17 gram packet, Take 17 g by mouth once daily., Disp: , Rfl:     pregabalin (Lyrica) 150 mg capsule, Take 1 capsule (150 mg) by mouth 2 times a day., Disp: , Rfl:     telmisartan-amlodipine (Twynsta) 80-10 mg tablet, Take 1 tablet by mouth once daily., Disp: , Rfl:      Lab Results   Component Value Date    WBC 5.1 02/06/2024    HGB 14.4 02/06/2024    HCT 42.8 02/06/2024     02/06/2024    ALT 14 02/06/2024    AST 18 02/06/2024     02/06/2024    K 4.3 02/06/2024     02/06/2024    CREATININE 0.71 04/16/2024    BUN 16 04/16/2024    CO2 25 02/06/2024    INR 1.0 02/06/2024       BI mammo left diagnostic tomosynthesis    Result Date: 10/28/2024  Interpreted By:  Lianet Ramos, STUDY: BI MAMMO LEFT DIAGNOSTIC TOMOSYNTHESIS;  10/28/2024 8:24 am   ACCESSION NUMBER(S): [id number]   ORDERING CLINICIAN: YANELIS COOPER   INDICATION: Patient recalled from screening mammography for further evaluation of a left breast asymmetry. Family history of breast cancer with her aunt diagnosed.   ,R92.8 Other abnormal and inconclusive findings on diagnostic imaging of breast   COMPARISON: 08/29/2024, 08/26/2023, 03/11/2021   FINDINGS: MAMMOGRAPHY: 2D and tomosynthesis images were reviewed at 1 mm slice thickness.   Density:  There are scattered areas of fibroglandular density.   The previously noted asymmetry in the medial left breast at middle depth on the craniocaudal view resolves into fibroglandular and fatty breast tissue with the additional images. No suspicious masses or calcifications are identified.       No mammographic evidence of malignancy in the left breast.   BI-RADS CATEGORY: BI-RADS Category:  1 Negative. Recommendation:  Annual Screening. Recommended Date:  10 Months. Laterality:  Bilateral.   For any future breast imaging appointments, please call [phone] ([phone]).     MACRO: None   Signed by:  Lianet Ramos 10/28/2024 8:53 AM Dictation workstation:   [id number]                Assessment/Plan   Assessment & Plan      This patient presents today for follow-up of her venous issues of her lower extremities.  She states that she has not been wearing her compression stockings.  She also gained weight over the holidays.  She is not happy about that.  She does get some swelling involving both lower legs.  She denies any significant achiness or heaviness.  At this time, I am encouraging her to start an exercise program on a consistent basis and to periodically elevate her legs above her heart.  She needs to be much more strict with her food choices.  She did have some venous insufficiency identified on her previous scan which was in December 2023 over a year ago.  At this time, I would like to repeat her venous duplex scan and reflux study and follow-up in 6 months      Michael Weinstein, DO

## 2025-02-13 NOTE — PROGRESS NOTES
Detail Level: Detailed Physical Therapy    Physical Therapy Evaluation    Patient Name: Mihaela Reaves  MRN: [id number]  Today's Date: 2/13/2024   Time Calculation  Start Time: 1442  Stop Time: 1518  Time Calculation (min): 36 min    Assessment/Plan   PT Assessment  PT Assessment Results: Decreased strength, Decreased range of motion, Decreased endurance, Impaired balance, Decreased mobility, Pain  End of Session Communication: Bedside nurse (orthopedic rn)  End of Session Patient Position:  (in w/c w/ ot present)  IP OR SWING BED PT PLAN  Inpatient or Swing Bed: Inpatient  PT Plan  Treatment/Interventions: Bed mobility, Transfer training, Gait training, Stair training, Strengthening, Range of motion  PT Plan: Skilled PT  PT Frequency: Daily  PT Discharge Recommendations: Low intensity level of continued care (cga of 1 for fxal mob)  PT - OK to Discharge: Yes      Subjective   General Visit Information:  General  Reason for Referral:  (arthritis r knee, 2/13 r tkr)  Referred By:  (masha meyer)  Past Medical History Relevant to Rehab: htn, arthritis, obese gerd, l tkr  Family/Caregiver Present:  (spouse)  Prior to Session Communication: Bedside nurse  Patient Position Received:  (cart, rails up)  Home Living:  Home Living  Type of Home: House  Lives With: Spouse (who will be w/ pt at discharge)  Home Adaptive Equipment:  ((2) rw, cane)  Home Layout: Two level, Bed/bath upstairs, 1/2 bath on main level (plans to stay on 1st floor couch)  Home Access: Stairs to enter with rails  Entrance Stairs-Rails: Both  Entrance Stairs-Number of Steps:  (2)  Prior Level of Function:  Prior Function Per Pt/Caregiver Report  Level of [place]:  (indep w/o ad)  Homemaking Assistance:  (share, pt drives, works)  Precautions:  Precautions  Precautions Comment:  (falls, tkr, fwb, oob,)  Vital Signs:       Objective   Pain:  Pain Assessment  Pain Score:  (tkr 6/10 at rest, 9/10 w/ slr)  Cognition:  Cognition  Overall Cognitive Status: Within Functional  Limits        Functional Assessments:  Bed Mobility  Bed Mobility:  (sba supine to sit)    Transfers  Transfer:  (cga sit<>stand)    Ambulation/Gait Training  Ambulation/Gait Training Performed:  (cga w/ rw 10ft, slowed, no lob)    Stairs  Stairs:  (4 steps w/ b hr w/ cga of 1)  Extremity/Trunk Assessments:  RLE   RLE :  (hip/knee 3-/5, ankle wfl, able to slr)  LLE   LLE : Within Functional Limits  Outcome Measures:  [facility] Basic Mobility  Turning from your back to your side while in a flat bed without using bedrails: None  Moving from lying on your back to sitting on the side of a flat bed without using bedrails: A little  Moving to and from bed to chair (including a wheelchair): A little  Standing up from a chair using your arms (e.g. wheelchair or bedside chair): A little  To walk in hospital room: A little  Climbing 3-5 steps with railing: A little  Basic Mobility - Total Score: 19    Encounter Problems       Encounter Problems (Active)       PT Problem       PT Goal 1 (Progressing)       Start:  02/13/24    Expected End:  02/14/24       Indep bed mob, txs          PT Goal 2 (Progressing)       Start:  02/13/24    Expected End:  02/14/24       Mod indep/s w/ rw 150ft x4          PT Goal 3 (Progressing)       Start:  02/13/24    Expected End:  02/14/24       Tkr 0-100 degrees          PT Goal 4 (Progressing)       Start:  02/13/24    Expected End:  02/14/24       20-30 reps tkr there ex         PT Goal 5 (Progressing)       Start:  02/13/24    Expected End:  02/14/24       4 steps w/ b hr w/ sba                Education Documentation  Handouts, taught by Meryl Delacruz, PT at 2/13/2024  3:52 PM.  Learner: Patient  Readiness: Eager  Method: Explanation, Demonstration, Handout  Response: Verbalizes Understanding, Demonstrated Understanding    Home Exercise Program, taught by Meryl Delacruz, PT at 2/13/2024  3:52 PM.  Learner: Patient  Readiness: Eager  Method: Explanation, Demonstration, Handout  Response:  Verbalizes Understanding, Demonstrated Understanding    Mobility Training, taught by Meryl Delacruz, PT at 2/13/2024  3:52 PM.  Learner: Patient  Readiness: Eager  Method: Explanation, Demonstration, Handout  Response: Verbalizes Understanding, Demonstrated Understanding    Education Comments  No comments found.

## 2025-03-24 VITALS
RESPIRATION RATE: 15 BRPM | HEART RATE: 81 BPM | HEART RATE: 69 BPM | RESPIRATION RATE: 15 BRPM | HEART RATE: 84 BPM | SYSTOLIC BLOOD PRESSURE: 113 MMHG | HEART RATE: 72 BPM | RESPIRATION RATE: 15 BRPM | HEART RATE: 86 BPM | DIASTOLIC BLOOD PRESSURE: 54 MMHG | DIASTOLIC BLOOD PRESSURE: 53 MMHG | RESPIRATION RATE: 19 BRPM | SYSTOLIC BLOOD PRESSURE: 118 MMHG | HEART RATE: 82 BPM | DIASTOLIC BLOOD PRESSURE: 59 MMHG | DIASTOLIC BLOOD PRESSURE: 46 MMHG | SYSTOLIC BLOOD PRESSURE: 115 MMHG | DIASTOLIC BLOOD PRESSURE: 70 MMHG | RESPIRATION RATE: 19 BRPM | SYSTOLIC BLOOD PRESSURE: 119 MMHG | DIASTOLIC BLOOD PRESSURE: 53 MMHG | HEART RATE: 84 BPM | DIASTOLIC BLOOD PRESSURE: 52 MMHG | DIASTOLIC BLOOD PRESSURE: 52 MMHG | HEART RATE: 93 BPM | DIASTOLIC BLOOD PRESSURE: 49 MMHG | RESPIRATION RATE: 16 BRPM | RESPIRATION RATE: 17 BRPM | HEART RATE: 89 BPM | OXYGEN SATURATION: 97 % | HEART RATE: 93 BPM | SYSTOLIC BLOOD PRESSURE: 113 MMHG | SYSTOLIC BLOOD PRESSURE: 110 MMHG | SYSTOLIC BLOOD PRESSURE: 100 MMHG | SYSTOLIC BLOOD PRESSURE: 99 MMHG | SYSTOLIC BLOOD PRESSURE: 104 MMHG | RESPIRATION RATE: 16 BRPM | DIASTOLIC BLOOD PRESSURE: 59 MMHG | RESPIRATION RATE: 21 BRPM | HEART RATE: 94 BPM | DIASTOLIC BLOOD PRESSURE: 62 MMHG | RESPIRATION RATE: 16 BRPM | OXYGEN SATURATION: 96 % | RESPIRATION RATE: 23 BRPM | RESPIRATION RATE: 19 BRPM | SYSTOLIC BLOOD PRESSURE: 116 MMHG | WEIGHT: 214 LBS | RESPIRATION RATE: 14 BRPM | DIASTOLIC BLOOD PRESSURE: 77 MMHG | HEIGHT: 66 IN | HEART RATE: 69 BPM | HEART RATE: 94 BPM | SYSTOLIC BLOOD PRESSURE: 99 MMHG | SYSTOLIC BLOOD PRESSURE: 104 MMHG | DIASTOLIC BLOOD PRESSURE: 49 MMHG | DIASTOLIC BLOOD PRESSURE: 45 MMHG | SYSTOLIC BLOOD PRESSURE: 119 MMHG | WEIGHT: 214 LBS | DIASTOLIC BLOOD PRESSURE: 59 MMHG | SYSTOLIC BLOOD PRESSURE: 110 MMHG | HEART RATE: 69 BPM | SYSTOLIC BLOOD PRESSURE: 101 MMHG | RESPIRATION RATE: 14 BRPM | DIASTOLIC BLOOD PRESSURE: 54 MMHG | DIASTOLIC BLOOD PRESSURE: 70 MMHG | HEART RATE: 80 BPM | DIASTOLIC BLOOD PRESSURE: 47 MMHG | HEART RATE: 86 BPM | DIASTOLIC BLOOD PRESSURE: 54 MMHG | RESPIRATION RATE: 17 BRPM | HEART RATE: 94 BPM | SYSTOLIC BLOOD PRESSURE: 110 MMHG | DIASTOLIC BLOOD PRESSURE: 49 MMHG | OXYGEN SATURATION: 97 % | DIASTOLIC BLOOD PRESSURE: 65 MMHG | OXYGEN SATURATION: 95 % | DIASTOLIC BLOOD PRESSURE: 65 MMHG | OXYGEN SATURATION: 97 % | HEART RATE: 72 BPM | DIASTOLIC BLOOD PRESSURE: 45 MMHG | RESPIRATION RATE: 21 BRPM | SYSTOLIC BLOOD PRESSURE: 120 MMHG | SYSTOLIC BLOOD PRESSURE: 116 MMHG | HEART RATE: 89 BPM | DIASTOLIC BLOOD PRESSURE: 46 MMHG | DIASTOLIC BLOOD PRESSURE: 45 MMHG | SYSTOLIC BLOOD PRESSURE: 118 MMHG | SYSTOLIC BLOOD PRESSURE: 113 MMHG | SYSTOLIC BLOOD PRESSURE: 120 MMHG | RESPIRATION RATE: 14 BRPM | HEART RATE: 86 BPM | DIASTOLIC BLOOD PRESSURE: 53 MMHG | SYSTOLIC BLOOD PRESSURE: 118 MMHG | DIASTOLIC BLOOD PRESSURE: 52 MMHG | HEART RATE: 82 BPM | HEART RATE: 81 BPM | SYSTOLIC BLOOD PRESSURE: 120 MMHG | DIASTOLIC BLOOD PRESSURE: 65 MMHG | HEART RATE: 72 BPM | SYSTOLIC BLOOD PRESSURE: 100 MMHG | SYSTOLIC BLOOD PRESSURE: 115 MMHG | HEART RATE: 93 BPM | SYSTOLIC BLOOD PRESSURE: 115 MMHG | WEIGHT: 214 LBS | HEART RATE: 80 BPM | OXYGEN SATURATION: 95 % | SYSTOLIC BLOOD PRESSURE: 99 MMHG | SYSTOLIC BLOOD PRESSURE: 100 MMHG | OXYGEN SATURATION: 95 % | OXYGEN SATURATION: 96 % | DIASTOLIC BLOOD PRESSURE: 70 MMHG | SYSTOLIC BLOOD PRESSURE: 101 MMHG | SYSTOLIC BLOOD PRESSURE: 116 MMHG | SYSTOLIC BLOOD PRESSURE: 101 MMHG | RESPIRATION RATE: 21 BRPM | RESPIRATION RATE: 17 BRPM | SYSTOLIC BLOOD PRESSURE: 119 MMHG | DIASTOLIC BLOOD PRESSURE: 77 MMHG | OXYGEN SATURATION: 96 % | HEART RATE: 89 BPM | SYSTOLIC BLOOD PRESSURE: 104 MMHG | HEART RATE: 81 BPM | HEART RATE: 80 BPM | RESPIRATION RATE: 23 BRPM | RESPIRATION RATE: 23 BRPM | DIASTOLIC BLOOD PRESSURE: 62 MMHG | HEIGHT: 66 IN | HEIGHT: 66 IN | DIASTOLIC BLOOD PRESSURE: 62 MMHG | HEART RATE: 82 BPM | DIASTOLIC BLOOD PRESSURE: 47 MMHG | DIASTOLIC BLOOD PRESSURE: 47 MMHG | HEART RATE: 84 BPM | DIASTOLIC BLOOD PRESSURE: 77 MMHG | DIASTOLIC BLOOD PRESSURE: 46 MMHG

## 2025-03-31 ENCOUNTER — AMBULATORY SURGICAL CENTER (OUTPATIENT)
Dept: URBAN - METROPOLITAN AREA SURGERY 12 | Facility: SURGERY | Age: 65
End: 2025-03-31
Payer: COMMERCIAL

## 2025-03-31 ENCOUNTER — OFFICE (OUTPATIENT)
Dept: URBAN - METROPOLITAN AREA PATHOLOGY 2 | Facility: PATHOLOGY | Age: 65
End: 2025-03-31
Payer: COMMERCIAL

## 2025-03-31 DIAGNOSIS — K63.5 POLYP OF COLON: ICD-10-CM

## 2025-03-31 DIAGNOSIS — K64.4 RESIDUAL HEMORRHOIDAL SKIN TAGS: ICD-10-CM

## 2025-03-31 DIAGNOSIS — K57.30 DIVERTICULOSIS OF LARGE INTESTINE WITHOUT PERFORATION OR ABS: ICD-10-CM

## 2025-03-31 DIAGNOSIS — K64.8 OTHER HEMORRHOIDS: ICD-10-CM

## 2025-03-31 DIAGNOSIS — K92.1 MELENA: ICD-10-CM

## 2025-03-31 PROCEDURE — 88305 TISSUE EXAM BY PATHOLOGIST: CPT | Performed by: PATHOLOGY

## 2025-03-31 PROCEDURE — 45380 COLONOSCOPY AND BIOPSY: CPT | Performed by: INTERNAL MEDICINE

## 2025-05-31 ENCOUNTER — OFFICE VISIT (OUTPATIENT)
Dept: URGENT CARE | Age: 65
End: 2025-05-31
Payer: COMMERCIAL

## 2025-05-31 VITALS
WEIGHT: 215 LBS | OXYGEN SATURATION: 97 % | HEART RATE: 78 BPM | DIASTOLIC BLOOD PRESSURE: 75 MMHG | SYSTOLIC BLOOD PRESSURE: 111 MMHG | BODY MASS INDEX: 34.55 KG/M2 | RESPIRATION RATE: 18 BRPM | TEMPERATURE: 97.8 F | HEIGHT: 66 IN

## 2025-05-31 DIAGNOSIS — H10.32 ACUTE BACTERIAL CONJUNCTIVITIS OF LEFT EYE: Primary | ICD-10-CM

## 2025-05-31 PROCEDURE — 3008F BODY MASS INDEX DOCD: CPT | Performed by: HOSPITALIST

## 2025-05-31 PROCEDURE — 99213 OFFICE O/P EST LOW 20 MIN: CPT | Performed by: HOSPITALIST

## 2025-05-31 PROCEDURE — 99070 SPECIAL SUPPLIES PHYS/QHP: CPT | Performed by: HOSPITALIST

## 2025-05-31 PROCEDURE — 3074F SYST BP LT 130 MM HG: CPT | Performed by: HOSPITALIST

## 2025-05-31 PROCEDURE — 1036F TOBACCO NON-USER: CPT | Performed by: HOSPITALIST

## 2025-05-31 PROCEDURE — 1159F MED LIST DOCD IN RCRD: CPT | Performed by: HOSPITALIST

## 2025-05-31 PROCEDURE — 3078F DIAST BP <80 MM HG: CPT | Performed by: HOSPITALIST

## 2025-05-31 RX ORDER — OFLOXACIN 3 MG/ML
1 SOLUTION/ DROPS OPHTHALMIC 4 TIMES DAILY
Qty: 5 ML | Refills: 0 | Status: SHIPPED | OUTPATIENT
Start: 2025-05-31 | End: 2025-06-10

## 2025-05-31 ASSESSMENT — PATIENT HEALTH QUESTIONNAIRE - PHQ9
SUM OF ALL RESPONSES TO PHQ9 QUESTIONS 1 AND 2: 0
1. LITTLE INTEREST OR PLEASURE IN DOING THINGS: NOT AT ALL
2. FEELING DOWN, DEPRESSED OR HOPELESS: NOT AT ALL

## 2025-05-31 ASSESSMENT — ENCOUNTER SYMPTOMS
EYE DISCHARGE: 1
RESPIRATORY NEGATIVE: 1
CONSTITUTIONAL NEGATIVE: 1

## 2025-05-31 NOTE — PROGRESS NOTES
"Subjective   Patient ID: Mihaela Reaves is a 65 y.o. female. They present today with a chief complaint of Eye Problem (Left eye redness and drainage after 4 days of cold symptoms).    History of Present Illness  Pt is a 65 year old who presented with uri sx and left eye conjunctavitis      Eye Problem  Associated symptoms: discharge        Past Medical History  Allergies as of 05/31/2025 - Reviewed 05/31/2025   Allergen Reaction Noted    Tetracyclines GI Upset, GI intolerance, Diarrhea, and Other 06/27/2014    Sulfa (sulfonamide antibiotics) Hives 06/27/2014       Prescriptions Prior to Admission[1]     Medical History[2]    Surgical History[3]     reports that she has never smoked. She has never used smokeless tobacco. She reports that she does not drink alcohol.    Review of Systems  Review of Systems   Constitutional: Negative.    HENT:  Positive for congestion.    Eyes:  Positive for discharge.   Respiratory: Negative.                                    Objective    Vitals:    05/31/25 1438   BP: 111/75   BP Location: Left arm   Patient Position: Sitting   Pulse: 78   Resp: 18   Temp: 36.6 °C (97.8 °F)   SpO2: 97%   Weight: 97.5 kg (215 lb)   Height: 1.676 m (5' 6\")     No LMP recorded. Patient is postmenopausal.    Physical Exam  Constitutional:       Appearance: Normal appearance.   HENT:      Head: Normocephalic.      Right Ear: Tympanic membrane normal.      Left Ear: Tympanic membrane normal.      Nose: Nose normal.   Eyes:      General:         Left eye: Discharge present.     Extraocular Movements: Extraocular movements intact.      Pupils: Pupils are equal, round, and reactive to light.   Cardiovascular:      Rate and Rhythm: Normal rate and regular rhythm.   Pulmonary:      Effort: Pulmonary effort is normal.      Breath sounds: Normal breath sounds.   Neurological:      Mental Status: She is alert.         Procedures    Point of Care Test & Imaging Results from this visit  No results found for this " visit on 25.   Imaging  No results found.    Cardiology, Vascular, and Other Imaging  No other imaging results found for the past 2 days      Diagnostic study results (if any) were reviewed by Mine Gan MD.    Assessment/Plan   Allergies, medications, history, and pertinent labs/EKGs/Imaging reviewed by Mine Gan MD.     Medical Decision Making  Uri with conjunctavitis    Orders and Diagnoses  Diagnoses and all orders for this visit:  Acute bacterial conjunctivitis of left eye  -     ofloxacin (Ocuflox) 0.3 % ophthalmic solution; Administer 1 drop into the left eye 4 times a day for 10 days.      Medical Admin Record      Patient disposition: Home    Electronically signed by Mine Gan MD  2:49 PM           [1] (Not in a hospital admission)  [2]   Past Medical History:  Diagnosis Date    Arthritis     Colon polyp     GERD (gastroesophageal reflux disease)     Hypertension     Personal history of other diseases of the female genital tract     History of heavy periods   [3]   Past Surgical History:  Procedure Laterality Date     SECTION, CLASSIC  2018     Section    HYSTERECTOMY      Hysterectomy    OTHER SURGICAL HISTORY  2022    Knee replacement    TONSILLECTOMY  2018    Tonsillectomy    VEIN SURGERY

## 2025-06-07 ENCOUNTER — OFFICE VISIT (OUTPATIENT)
Dept: URGENT CARE | Age: 65
End: 2025-06-07
Payer: COMMERCIAL

## 2025-06-07 VITALS
TEMPERATURE: 97.8 F | OXYGEN SATURATION: 96 % | DIASTOLIC BLOOD PRESSURE: 72 MMHG | RESPIRATION RATE: 24 BRPM | HEART RATE: 93 BPM | SYSTOLIC BLOOD PRESSURE: 110 MMHG

## 2025-06-07 DIAGNOSIS — J02.9 SORE THROAT: Primary | ICD-10-CM

## 2025-06-07 DIAGNOSIS — J06.9 VIRAL URI: ICD-10-CM

## 2025-06-07 LAB
POC HUMAN RHINOVIRUS PCR: NEGATIVE
POC INFLUENZA A VIRUS PCR: NEGATIVE
POC INFLUENZA B VIRUS PCR: NEGATIVE
POC RESPIRATORY SYNCYTIAL VIRUS PCR: NEGATIVE
POC STREPTOCOCCUS PYOGENES (GROUP A STREP) PCR: NEGATIVE

## 2025-06-07 ASSESSMENT — ENCOUNTER SYMPTOMS
SINUS PRESSURE: 0
ENDOCRINE NEGATIVE: 1
SINUS PAIN: 0
SORE THROAT: 1
EYES NEGATIVE: 1
RHINORRHEA: 1
RESPIRATORY NEGATIVE: 1
CONSTITUTIONAL NEGATIVE: 1

## 2025-06-07 ASSESSMENT — PAIN SCALES - GENERAL: PAINLEVEL_OUTOF10: 8

## 2025-06-07 NOTE — PROGRESS NOTES
Subjective   Patient ID: Mihaela Reaves is a 65 y.o. female. They present today with a chief complaint of Sore Throat (  Per Pt hx throat hurting for six days and ears hurting for a couple days and has been coughing.).    History of Present Illness    Sore Throat   Associated symptoms include congestion.     St and nasal drip for 6 days.  Throat is irritated. Dry cough. No fever or weakness. No otc tried.  No swollen glands.  Drg is clear.   Past Medical History  Allergies as of 06/07/2025 - Reviewed 06/07/2025   Allergen Reaction Noted    Tetracyclines GI Upset, GI intolerance, Diarrhea, and Other 06/27/2014    Sulfa (sulfonamide antibiotics) Hives 06/27/2014       Prescriptions Prior to Admission[1]     Medical History[2]    Surgical History[3]     reports that she has never smoked. She has never used smokeless tobacco. Alcohol use questions deferred to the physician. Drug use questions deferred to the physician.    Review of Systems  Review of Systems   Constitutional: Negative.    HENT:  Positive for congestion, postnasal drip, rhinorrhea and sore throat. Negative for sinus pressure and sinus pain.    Eyes: Negative.    Respiratory: Negative.     Endocrine: Negative.    Genitourinary: Negative.                                   Objective    Vitals:    06/07/25 1453   BP: 110/72   BP Location: Right arm   Patient Position: Sitting   BP Cuff Size: Large adult   Pulse: 93   Resp: 24   Temp: 36.6 °C (97.8 °F)   TempSrc: Oral   SpO2: 96%     No LMP recorded. Patient is postmenopausal.    Physical Exam  Constitutional:       Appearance: Normal appearance.   HENT:      Head: Normocephalic and atraumatic.      Right Ear: Tympanic membrane, ear canal and external ear normal.      Left Ear: Tympanic membrane, ear canal and external ear normal.      Nose: No mucosal edema or rhinorrhea.      Right Sinus: No maxillary sinus tenderness or frontal sinus tenderness.      Left Sinus: No maxillary sinus tenderness or frontal  sinus tenderness.      Mouth/Throat:      Lips: Pink.      Mouth: Mucous membranes are moist.      Dentition: Normal dentition. No gum lesions.      Tongue: No lesions. Tongue does not deviate from midline.      Palate: No mass and lesions.      Pharynx: No pharyngeal swelling, posterior oropharyngeal erythema, uvula swelling or postnasal drip.      Comments: Erythema and clear drg. Mild cobblestoning poster pharynx   Cardiovascular:      Rate and Rhythm: Normal rate and regular rhythm.      Pulses: Normal pulses.      Heart sounds: Normal heart sounds.   Pulmonary:      Effort: Pulmonary effort is normal. No respiratory distress.      Breath sounds: Normal breath sounds. No stridor. No wheezing, rhonchi or rales.   Neurological:      Mental Status: She is alert.         Procedures    Point of Care Test & Imaging Results from this visit  Results for orders placed or performed in visit on 06/07/25   POCT SPOTFIRE R/ST Panel Mini w/Strep A ([facility]) manually resulted   Result Value Ref Range    POC Group A Strep, PCR Negative Negative    POC Respiratory Syncytial Virus PCR Negative Negative    POC Influenza A Virus PCR Negative Negative    POC Influenza B Virus PCR Negative Negative    POC Human Rhinovirus PCR Negative Negative      Imaging  No results found.    Cardiology, Vascular, and Other Imaging  No other imaging results found for the past 2 days      Diagnostic study results (if any) were reviewed by Dexter Perez PA-C.    Assessment/Plan   Allergies, medications, history, and pertinent labs/EKGs/Imaging reviewed by Dexter Perez PA-C.     Medical Decision Making  Uri sxs.,  Testing negative here.    Sleep with a humidifier in your bedroom to help relieve nighttime nasal blockages.  Use natural saline nasal sprays during the day and before bed.  Take hot showers, or use steam bowls to help ease congestion and swelling.  Rest to help your body fight infection and speed recovery.  Drink fluids, such as  water or juice, to help dilute mucous secretions and promote drainage.  Inhale steam from a shower or bath, and stay hydrated.  Use OTC medications such as nasal sprays.  Use a Neti pot, a therapy that uses a salt and water solution, to flush your nasal passages.     At time of discharge, patient was clinically well-appearing and appropriate for outpatient management. The patient/parent/guardian was educated regarding diagnosis, supportive care, OTC and Rx medications. The patient/parent/guardian was given the opportunity to ask questions prior to discharge. They verbalized understanding of discussion of treatment plan, expected course of illness and/or injury, indications on when to return to , when to seek further evaluation in ED/call 911, and the need to follow up with PCP and/or specialist as referred. Patient/parent/guardian was provided with work/school documentation if requested. Patient stable upon discharge.      Orders and Diagnoses  Diagnoses and all orders for this visit:  Sore throat  -     POCT SPOTFIRE R/ST Panel Mini w/Strep A ([facility]) manually resulted  Viral URI      Medical Admin Record      Patient disposition: Home    Electronically signed by Dexter Perez PA-C  3:25 PM           [1] (Not in a hospital admission)   [2]   Past Medical History:  Diagnosis Date    Arthritis     Colon polyp     GERD (gastroesophageal reflux disease)     Hypertension     Personal history of other diseases of the female genital tract     History of heavy periods   [3]   Past Surgical History:  Procedure Laterality Date     SECTION, CLASSIC  2018     Section    HYSTERECTOMY      Hysterectomy    OTHER SURGICAL HISTORY  2022    Knee replacement    TONSILLECTOMY  2018    Tonsillectomy    VEIN SURGERY

## 2025-06-14 ENCOUNTER — OFFICE VISIT (OUTPATIENT)
Dept: URGENT CARE | Age: 65
End: 2025-06-14
Payer: COMMERCIAL

## 2025-06-14 VITALS
OXYGEN SATURATION: 98 % | DIASTOLIC BLOOD PRESSURE: 73 MMHG | HEART RATE: 69 BPM | RESPIRATION RATE: 20 BRPM | TEMPERATURE: 97.6 F | SYSTOLIC BLOOD PRESSURE: 119 MMHG

## 2025-06-14 DIAGNOSIS — H10.32 ACUTE BACTERIAL CONJUNCTIVITIS OF LEFT EYE: Primary | ICD-10-CM

## 2025-06-14 RX ORDER — POLYMYXIN B SULFATE AND TRIMETHOPRIM 1; 10000 MG/ML; [USP'U]/ML
1 SOLUTION OPHTHALMIC EVERY 4 HOURS
Qty: 10 ML | Refills: 0 | Status: SHIPPED | OUTPATIENT
Start: 2025-06-14 | End: 2025-06-24

## 2025-06-14 ASSESSMENT — ENCOUNTER SYMPTOMS
WHEEZING: 0
NUMBNESS: 0
CHILLS: 0
BLURRED VISION: 0
SINUS PRESSURE: 0
FATIGUE: 0
HEADACHES: 0
EYE REDNESS: 1
TINGLING: 0
DOUBLE VISION: 0
BLIND SPOTS: 0
VOMITING: 0
FEVER: 0
PHOTOPHOBIA: 0
SINUS PAIN: 0
WEAKNESS: 0
NAUSEA: 0
COUGH: 0
EYE ITCHING: 0
EYE INFLAMMATION: 0
EYE WATERING: 0
SORE THROAT: 0
RHINORRHEA: 1
CRUSTING: 1
EYE DISCHARGE: 1
PERI-ORBITAL EDEMA: 0
SHORTNESS OF BREATH: 0

## 2025-06-14 ASSESSMENT — VISUAL ACUITY: OU: 1

## 2025-06-14 NOTE — PATIENT INSTRUCTIONS
You have been diagnosed with bacterial conjunctivitis (pink eye) in your left eye. This is a common eye infection that causes redness, discharge, and irritation. Treatment has been started with Polytrim eye drops.  Medication Instructions  Use Polytrim (polymyxin B/trimethoprim) eye drops as prescribed, typically one drop in the affected eye every 3 to 6 hours while awake, for 7 to 10 days (or as directed).  Wash hands thoroughly before and after applying drops.  Avoid touching the tip of the dropper to the eye or any surface.  Do not wear contact lenses until symptoms have fully resolved.  Preventing Spread  Avoid touching or rubbing your eyes.  Wash hands frequently.  Do not share towels, washcloths, or pillowcases.  Disinfect or replace any eye makeup or contact lenses that may have been contaminated.  What to Expect  Symptoms such as redness and discharge should start to improve within 2-3 days of starting drops.  Complete the full course of eye drops even if symptoms improve.  Call or Return If:  Vision becomes blurry or worsens  Significant eye pain or sensitivity to light develops  Swelling increases or spreads  You develop fever or other systemic symptoms  Follow-Up  Follow up with your primary care provider or eye specialist if symptoms do not improve or worsen.

## 2025-06-14 NOTE — PROGRESS NOTES
Subjective   Patient ID: Mihaela Reaves is a 65 y.o. female. They present today with a chief complaint of Eye Problem.    History of Present Illness  65-year-old female presents with recurrence of left eye redness and yellow discharge. She was seen at urgent care two weeks ago and diagnosed with bacterial conjunctivitis, treated with ofloxacin ophthalmic drops for 8 days. Symptoms initially resolved but returned yesterday. She wears contact lenses and reports that she disposed of her old lenses and all eye makeup, and laundered all bedding and towels after the initial infection. She denies eye pain, vision changes, photophobia, or trauma. Patient has used Polytrim eye drops in the past.      Eye Problem  Location:  Left eye  Quality: no pain.  Duration:  2 weeks  Timing:  Constant  Progression:  Partially resolved  Chronicity:  New  Context: contact lenses    Context: not burn, not chemical exposure, not direct trauma, not foreign body, not using machinery, not scratch, not smoke exposure and not UV exposure    Relieved by:  Eye drops  Associated symptoms: crusting, discharge and redness    Associated symptoms: no blurred vision, no decreased vision, no double vision, no facial rash, no headaches, no inflammation, no itching, no nausea, no numbness, no photophobia, no scotomas, no swelling, no tearing, no tingling, no vomiting and no weakness    Risk factors: recent URI    Risk factors: no conjunctival hemorrhage, no exposure to pinkeye, no previous injury to eye and no recent herpes zoster        Past Medical History  Allergies as of 06/14/2025 - Reviewed 06/14/2025   Allergen Reaction Noted    Tetracyclines GI Upset, GI intolerance, Diarrhea, and Other 06/27/2014    Sulfa (sulfonamide antibiotics) Hives 06/27/2014       Prescriptions Prior to Admission[1]     Medical History[2]    Surgical History[3]     reports that she has never smoked. She has never used smokeless tobacco. Alcohol use questions deferred to the  physician. Drug use questions deferred to the physician.    Review of Systems  Review of Systems   Constitutional:  Negative for chills, fatigue and fever.   HENT:  Positive for postnasal drip and rhinorrhea. Negative for congestion, sinus pressure, sinus pain and sore throat.    Eyes:  Positive for discharge and redness. Negative for blurred vision, double vision, photophobia and itching.   Respiratory:  Negative for cough, shortness of breath and wheezing.    Cardiovascular:  Negative for chest pain.   Gastrointestinal:  Negative for nausea and vomiting.   Neurological:  Negative for tingling, weakness, numbness and headaches.   All other systems reviewed and are negative.        Objective    Vitals:    06/14/25 1330   BP: 119/73   Pulse: 69   Resp: 20   Temp: 36.4 °C (97.6 °F)   TempSrc: Oral   SpO2: 98%     No LMP recorded. Patient is postmenopausal.    Physical Exam  Vitals and nursing note reviewed.   Constitutional:       General: She is not in acute distress.     Appearance: Normal appearance. She is normal weight. She is not ill-appearing or toxic-appearing.   HENT:      Head: Normocephalic.      Right Ear: Tympanic membrane, ear canal and external ear normal.      Left Ear: Tympanic membrane, ear canal and external ear normal.      Nose: Rhinorrhea present. No congestion.      Mouth/Throat:      Mouth: Mucous membranes are moist.      Pharynx: Oropharynx is clear. No oropharyngeal exudate or posterior oropharyngeal erythema.   Eyes:      General: Lids are normal. Lids are everted, no foreign bodies appreciated. Vision grossly intact. No visual field deficit.        Left eye: Discharge present.     Conjunctiva/sclera:      Left eye: Left conjunctiva is injected. Exudate present.      Pupils: Pupils are equal, round, and reactive to light.   Cardiovascular:      Rate and Rhythm: Normal rate and regular rhythm.      Pulses: Normal pulses.      Heart sounds: Normal heart sounds. No murmur heard.  Pulmonary:       Effort: Pulmonary effort is normal. No respiratory distress.      Breath sounds: Normal breath sounds. No wheezing.   Musculoskeletal:         General: Normal range of motion.      Cervical back: Normal range of motion and neck supple.   Skin:     General: Skin is warm.   Neurological:      Mental Status: She is alert and oriented to person, place, and time.   Psychiatric:         Mood and Affect: Mood normal.         Behavior: Behavior normal.         Procedures    Point of Care Test & Imaging Results from this visit  No results found for this visit on 06/14/25.   Imaging  No results found.    Cardiology, Vascular, and Other Imaging  No other imaging results found for the past 2 days      Diagnostic study results (if any) were reviewed by [facility].    Assessment/Plan   Allergies, medications, history, and pertinent labs/EKGs/Imaging reviewed by RICK Santana-CNP.     Medical Decision Making  Signs and symptoms consistent with bacterial conjunctivitis. There is no clinical evidence to suggest keratitis, iritis, uveitis, corneal abrasion, glaucoma, etc. Plan is for topical antibiotic therapy. Follow with PCP and return to clinic if symptoms change or worsen. No contact use for a week.  Patient verbalized understanding and agreeable with plan of care.      Orders and Diagnoses  Diagnoses and all orders for this visit:  Acute bacterial conjunctivitis of left eye  -     polymyxin B sulf-trimethoprim (Polytrim) ophthalmic solution; Administer 1 drop into the left eye every 4 hours for 10 days. While awake      Medical Admin Record      Patient disposition: Home    Electronically signed by [facility]  2:06 PM           [1] (Not in a hospital admission)   [2]   Past Medical History:  Diagnosis Date    Arthritis     Colon polyp     GERD (gastroesophageal reflux disease)     Hypertension     Personal history of other diseases of the female genital tract     History of heavy  periods   [3]   Past Surgical History:  Procedure Laterality Date     SECTION, CLASSIC  2018     Section    HYSTERECTOMY  2012    Hysterectomy    OTHER SURGICAL HISTORY  2022    Knee replacement    TONSILLECTOMY  2018    Tonsillectomy    VEIN SURGERY

## 2025-07-14 ENCOUNTER — APPOINTMENT (OUTPATIENT)
Dept: VASCULAR MEDICINE | Facility: CLINIC | Age: 65
End: 2025-07-14
Payer: COMMERCIAL

## 2025-07-18 ENCOUNTER — HOSPITAL ENCOUNTER (OUTPATIENT)
Dept: VASCULAR MEDICINE | Facility: CLINIC | Age: 65
Discharge: HOME | End: 2025-07-18
Payer: COMMERCIAL

## 2025-07-18 DIAGNOSIS — I83.893 VARICOSE VEINS OF BILATERAL LOWER EXTREMITIES WITH OTHER COMPLICATIONS: ICD-10-CM

## 2025-07-18 DIAGNOSIS — I87.2 CHRONIC VENOUS INSUFFICIENCY OF LOWER EXTREMITY: ICD-10-CM

## 2025-07-18 PROCEDURE — 93970 EXTREMITY STUDY: CPT

## 2025-07-24 ENCOUNTER — APPOINTMENT (OUTPATIENT)
Dept: VASCULAR SURGERY | Facility: CLINIC | Age: 65
End: 2025-07-24
Payer: COMMERCIAL

## 2025-07-24 VITALS
HEIGHT: 66 IN | WEIGHT: 214 LBS | SYSTOLIC BLOOD PRESSURE: 118 MMHG | HEART RATE: 90 BPM | DIASTOLIC BLOOD PRESSURE: 68 MMHG | BODY MASS INDEX: 34.39 KG/M2 | OXYGEN SATURATION: 99 %

## 2025-07-24 DIAGNOSIS — I87.2 CHRONIC VENOUS INSUFFICIENCY OF LOWER EXTREMITY: Primary | ICD-10-CM

## 2025-07-24 PROCEDURE — 3008F BODY MASS INDEX DOCD: CPT | Performed by: SURGERY

## 2025-07-24 PROCEDURE — 99213 OFFICE O/P EST LOW 20 MIN: CPT | Performed by: SURGERY

## 2025-07-24 PROCEDURE — 3074F SYST BP LT 130 MM HG: CPT | Performed by: SURGERY

## 2025-07-24 PROCEDURE — 1160F RVW MEDS BY RX/DR IN RCRD: CPT | Performed by: SURGERY

## 2025-07-24 PROCEDURE — 1159F MED LIST DOCD IN RCRD: CPT | Performed by: SURGERY

## 2025-07-24 PROCEDURE — 1036F TOBACCO NON-USER: CPT | Performed by: SURGERY

## 2025-07-24 PROCEDURE — 3078F DIAST BP <80 MM HG: CPT | Performed by: SURGERY

## 2025-07-24 NOTE — PROGRESS NOTES
"Mihaela Reaves is a 65 y.o. female     Subjective   This patient presents today for follow-up of her venous issues of her lower extremities.  She states that her legs are actually doing very well.  She is obese at 5 foot 6 and 214 pounds.  She has never smoked and she is currently not a diabetic.  It is difficult for her to wear her stockings during the heat of the summer.  She wears them on a very consistent basis with the cooler weather.  She has dropped off in her exercise of her lower extremities.  She denies any fever chills nausea vomiting or headache         Objective     Vitals:    07/24/25 0700   BP: 118/68   Pulse: 90   SpO2: 99%      Physical Exam  This patient is alert and oriented x3 her head is normocephalic neck is soft and supple without palpable lymph nodes.  Heart is regular rate.  Lungs are clear.  Abdomen is obese and soft with positive bowel sounds.  There is no pain on palpation.  Upper and lower extremities have adequate range of motion with palpable brachial radial femoral and popliteal pulses.  Skin turgor is adequate.  Psychologically the patient appears to be acting appropriately.  She has minimal swelling involving her lower legs.  She does have palpable pedal pulses  Blood pressure 118/68, pulse 90, height 1.676 m (5' 6\"), weight 97.1 kg (214 lb), SpO2 99%.            Patient Active Problem List    Diagnosis Date Noted    Arthritis of right knee 02/13/2024    Acute post-operative pain 02/13/2024    HTN (hypertension) 02/13/2024    Chronic venous insufficiency of lower extremity 01/04/2024        Current Medications[1]     Lab Results   Component Value Date    WBC 5.1 02/06/2024    HGB 14.4 02/06/2024    HCT 42.8 02/06/2024     02/06/2024    ALT 14 02/06/2024    AST 18 02/06/2024     02/06/2024    K 4.3 02/06/2024     02/06/2024    CREATININE 0.71 04/16/2024    BUN 16 04/16/2024    CO2 25 02/06/2024    INR 1.0 02/06/2024       Vascular US lower extremity venous " insufficiency bilateral  Result Date: 7/21/2025           [facility], Vascular Lab [address], [place] [address]             Tel [phone] and Fax [phone]  Vascular Lab Report VASC US LOWER EXTREMITY VENOUS INSUFFICIENCY BILATERAL  Patient Name:      YOVANY KAUR       Reading Physician:  [id number] Raz Pisano MD Study Date:        7/18/2025           Ordering Physician: [id number] ADALBERTO CARDENAS MRN/PID:           [id number]            Technologist:       Nicanor GRAHAMT, RDMS Accession#:        [id number]        Technologist 2: Date of Birth/Age: 1960 / 65 years Encounter#:         [id number] Gender:            F Admission Status:  Outpatient          Location Performed: [facility]  Diagnosis/ICD:    Varicose veins of bilateral lower extremities with other                   complications-I83.893 CPT Codes:        70206 Venous reflux study VV VI complete Patient Position: Study performed in a reverse Trendelenburg position.  CONCLUSIONS: Right Lower Venous Insufficiency: Reflux is noted in the saphenofemoral junction, mid thigh great saphenous and knee level of great saphenous veins. There is reflux noted in the common femoral vein. The AASV is noted to have a 4 sec reflux measuring aprox. 3.9mm in diameter. Prominent tributary with a reflux of 2.7sec and measuring aprox. 2.3mm is noted from the mid to distal thigh. Multiple varices noted in the calf. Left Lower Venous Insufficiency: There is reflux noted in the common femoral vein. The left great saphenous vein was not visualized due to previous ablation. Right Lower Venous: No evidence of acute deep vein thrombus visualized in the right lower extremity. Left Lower Venous: No evidence of acute deep vein thrombus visualized in the left lower extremity.  Comparison:  Compared with study from 12/26/2023, no significant change.  Imaging & Doppler Findings:  Right          Compress Thrombus  Diam    Time SFJ              Yes      None   7.1 mm 3.10 sec Prox Thigh GSV   Yes      None Mid Thigh GSV    Yes      None          2.70 sec Knee GSV         Yes      None          7.10 sec Prox Calf GSV    Yes      None Mid Calf GSV     Yes      None Dist Calf GSV    Yes      None SPJ              Yes      None SSV Prox         Yes      None SSV Mid          Yes      None SSV Distal       Yes      None Common FV                               2.80 sec  Left      Compress Thrombus   Time SFJ         Yes      None Common FV                   2.50 sec  Right                 Compressible Thrombus        Flow Distal External Iliac                None   Spontaneous/Phasic CFV                       Yes        None         Reflux PFV                       Yes        None FV Proximal               Yes        None FV Mid                    Yes        None   Spontaneous/Phasic FV Distal                 Yes        None Popliteal                 Yes        None   Spontaneous/Phasic Peroneal                  Yes        None PTV                       Yes        None  Left                  Compress Thrombus        Flow Distal External Iliac            None   Spontaneous/Phasic CFV                     Yes      None         Reflux PFV                     Yes      None FV Proximal             Yes      None FV Mid                  Yes      None   Spontaneous/Phasic FV Distal               Yes      None Popliteal               Yes      None   Spontaneous/Phasic Peroneal                Yes      None PTV                     Yes      None  [id number] Raz Pisano MD Electronically signed by [id number] Raz Pisano MD on 7/21/2025 at 2:25:44 PM  ** Final **                 Assessment/Plan   Assessment & Plan      This patient presents today for follow-up of her venous issues of her lower extremities.  She states that her  legs are doing well.  She has minimal swelling involving her lower legs.  She does have palpable pedal pulses.  She is currently not a diabetic and she has never smoked.  She is obese.  She has also fallen off of exercising on a consistent basis.  At this time, I am very much encouraging her to start exercising again.  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  When the cooler weather comes around, she also needs to start wearing her compression stockings on a consistent basis.  Weight loss would benefit her greatly.  She did recently have a venous duplex scan done with reflux study and I have extensively gone over the results with her.  She does have some segments of reflux in her right greater saphenous vein.  She also has deep system reflux involving the common femoral vein bilaterally.  Overall, I am quite pleased with her current status.  However, she really needs to start exercising and considering significant weight loss.      Michael Weinstein, DO        [1]   Current Outpatient Medications:     acetaminophen (Tylenol) 325 mg tablet, Take 2 tablets (650 mg) by mouth every 6 hours., Disp: , Rfl:     apixaban (Eliquis) 2.5 mg tablet, Take 1 tablet (2.5 mg) by mouth 2 times a day., Disp: 60 tablet, Rfl: 0    azithromycin (Zithromax) 250 mg tablet, Take 2 pills on day 1 once daily by mouth, followed by 1 pill once daily x 4 by mouth., Disp: 6 tablet, Rfl: 0    bisoprolol/hydrochlorothiazide (ZIAC ORAL), Take by mouth., Disp: , Rfl:     MELOXICAM ORAL, Take by mouth., Disp: , Rfl:     ondansetron (Zofran) 4 mg tablet, Take 1 tablet (4 mg) by mouth every 8 hours if needed for nausea or vomiting., Disp: 20 tablet, Rfl: 0    polyethylene glycol (Glycolax, Miralax) 17 gram packet, Take 17 g by mouth once daily., Disp: , Rfl:     pregabalin (Lyrica) 150 mg capsule, Take 1 capsule (150 mg) by mouth 2 times a day., Disp: , Rfl:      telmisartan-amlodipine (Twynsta) 80-10 mg tablet, Take 1 tablet by mouth once daily., Disp: , Rfl:

## 2025-08-16 ENCOUNTER — OFFICE VISIT (OUTPATIENT)
Dept: URGENT CARE | Age: 65
End: 2025-08-16
Payer: COMMERCIAL

## 2025-08-16 VITALS
SYSTOLIC BLOOD PRESSURE: 143 MMHG | OXYGEN SATURATION: 96 % | DIASTOLIC BLOOD PRESSURE: 79 MMHG | BODY MASS INDEX: 34.55 KG/M2 | WEIGHT: 215 LBS | HEIGHT: 66 IN | RESPIRATION RATE: 18 BRPM | HEART RATE: 80 BPM | TEMPERATURE: 97.6 F

## 2025-08-16 DIAGNOSIS — L03.116 CELLULITIS OF LEFT LOWER EXTREMITY: Primary | ICD-10-CM

## 2025-08-16 RX ORDER — DOXYCYCLINE 100 MG/1
100 CAPSULE ORAL 2 TIMES DAILY
Qty: 20 CAPSULE | Refills: 0 | Status: SHIPPED | OUTPATIENT
Start: 2025-08-16 | End: 2025-08-26

## 2025-08-16 ASSESSMENT — PATIENT HEALTH QUESTIONNAIRE - PHQ9
1. LITTLE INTEREST OR PLEASURE IN DOING THINGS: NOT AT ALL
2. FEELING DOWN, DEPRESSED OR HOPELESS: NOT AT ALL
SUM OF ALL RESPONSES TO PHQ9 QUESTIONS 1 AND 2: 0

## 2025-08-16 ASSESSMENT — ENCOUNTER SYMPTOMS: CONSTITUTIONAL NEGATIVE: 1

## 2026-01-29 ENCOUNTER — APPOINTMENT (OUTPATIENT)
Dept: VASCULAR SURGERY | Facility: CLINIC | Age: 66
End: 2026-01-29
Payer: COMMERCIAL

## (undated) DEVICE — BLADE, SAW, RECIPROCATING, DOUBLE SIDED, THIN, STAINLESS STEEL

## (undated) DEVICE — SYRINGE, 50 CC, LUER LOCK

## (undated) DEVICE — BASIN KIT, SINGLE

## (undated) DEVICE — SLEEVE, VASO PRESS, CALF GARMENT, MEDIUM, GREEN

## (undated) DEVICE — COVER, MAYO STAND, W/PAD, 23 IN, DISPOSABLE, PLASTIC, LF, STERILE

## (undated) DEVICE — SEALER, BIPOLAR, AQUA MANTYS 6.0

## (undated) DEVICE — DRAPE, U-DRAPE, NON STERILE

## (undated) DEVICE — WOUND SYSTEM, DEBRIDEMENT & CLEANING, O.R DUOPAK

## (undated) DEVICE — DRAPE, SHEET, THREE QUARTER, FAN FOLD, 57 X 77 IN

## (undated) DEVICE — BANDAGE, COFLEX, 6 X 5 YDS, FOAM TAN, STERILE, LF

## (undated) DEVICE — BLADE, SAGITTAL DUAL CUT, 25 X 90 X 1.27

## (undated) DEVICE — GLIDESCOPE BLADE, SPECTRUM SU, LOPRO S4

## (undated) DEVICE — CEMENT, BONE, CMW 1, 40 GM

## (undated) DEVICE — DRESSING, AQUACEL AG, HYDROFIBER W/SILVER, 3.5 X 13.75 IN